# Patient Record
Sex: MALE | Race: WHITE | NOT HISPANIC OR LATINO | Employment: OTHER | URBAN - METROPOLITAN AREA
[De-identification: names, ages, dates, MRNs, and addresses within clinical notes are randomized per-mention and may not be internally consistent; named-entity substitution may affect disease eponyms.]

---

## 2022-07-24 ENCOUNTER — HOSPITAL ENCOUNTER (INPATIENT)
Facility: CLINIC | Age: 82
LOS: 3 days | Discharge: HOSPICE | DRG: 435 | End: 2022-07-27
Attending: EMERGENCY MEDICINE | Admitting: HOSPITALIST
Payer: MEDICARE

## 2022-07-24 ENCOUNTER — APPOINTMENT (OUTPATIENT)
Dept: CT IMAGING | Facility: CLINIC | Age: 82
DRG: 435 | End: 2022-07-24
Attending: EMERGENCY MEDICINE
Payer: MEDICARE

## 2022-07-24 DIAGNOSIS — R62.7 FAILURE TO THRIVE IN ADULT: ICD-10-CM

## 2022-07-24 DIAGNOSIS — C79.9 METASTATIC MALIGNANT NEOPLASM, UNSPECIFIED SITE (H): ICD-10-CM

## 2022-07-24 PROBLEM — I81 PORTAL VEIN THROMBOSIS: Status: ACTIVE | Noted: 2022-07-24

## 2022-07-24 LAB
ALBUMIN SERPL-MCNC: 1.9 G/DL (ref 3.4–5)
ALP SERPL-CCNC: 759 U/L (ref 40–150)
ALT SERPL W P-5'-P-CCNC: 33 U/L (ref 0–70)
ANION GAP SERPL CALCULATED.3IONS-SCNC: 8 MMOL/L (ref 3–14)
AST SERPL W P-5'-P-CCNC: 231 U/L (ref 0–45)
BASOPHILS # BLD AUTO: 0.1 10E3/UL (ref 0–0.2)
BASOPHILS NFR BLD AUTO: 1 %
BILIRUB SERPL-MCNC: 2.2 MG/DL (ref 0.2–1.3)
BUN SERPL-MCNC: 25 MG/DL (ref 7–30)
CALCIUM SERPL-MCNC: 9.8 MG/DL (ref 8.5–10.1)
CHLORIDE BLD-SCNC: 105 MMOL/L (ref 94–109)
CO2 SERPL-SCNC: 23 MMOL/L (ref 20–32)
CREAT SERPL-MCNC: 0.58 MG/DL (ref 0.66–1.25)
D DIMER PPP FEU-MCNC: 3.48 UG/ML FEU (ref 0–0.5)
EOSINOPHIL # BLD AUTO: 0.1 10E3/UL (ref 0–0.7)
EOSINOPHIL NFR BLD AUTO: 1 %
ERYTHROCYTE [DISTWIDTH] IN BLOOD BY AUTOMATED COUNT: 17.2 % (ref 10–15)
GFR SERPL CREATININE-BSD FRML MDRD: >90 ML/MIN/1.73M2
GLUCOSE BLD-MCNC: 180 MG/DL (ref 70–99)
HCT VFR BLD AUTO: 45.7 % (ref 40–53)
HGB BLD-MCNC: 14.7 G/DL (ref 13.3–17.7)
HOLD SPECIMEN: NORMAL
IMM GRANULOCYTES # BLD: 0.1 10E3/UL
IMM GRANULOCYTES NFR BLD: 1 %
LIPASE SERPL-CCNC: 112 U/L (ref 73–393)
LYMPHOCYTES # BLD AUTO: 2.1 10E3/UL (ref 0.8–5.3)
LYMPHOCYTES NFR BLD AUTO: 19 %
MCH RBC QN AUTO: 33.3 PG (ref 26.5–33)
MCHC RBC AUTO-ENTMCNC: 32.2 G/DL (ref 31.5–36.5)
MCV RBC AUTO: 103 FL (ref 78–100)
MONOCYTES # BLD AUTO: 0.7 10E3/UL (ref 0–1.3)
MONOCYTES NFR BLD AUTO: 6 %
NEUTROPHILS # BLD AUTO: 8.1 10E3/UL (ref 1.6–8.3)
NEUTROPHILS NFR BLD AUTO: 72 %
NRBC # BLD AUTO: 0 10E3/UL
NRBC BLD AUTO-RTO: 0 /100
PLATELET # BLD AUTO: 279 10E3/UL (ref 150–450)
POTASSIUM BLD-SCNC: 4 MMOL/L (ref 3.4–5.3)
PROT SERPL-MCNC: 7 G/DL (ref 6.8–8.8)
RADIOLOGIST FLAGS: ABNORMAL
RBC # BLD AUTO: 4.42 10E6/UL (ref 4.4–5.9)
SARS-COV-2 RNA RESP QL NAA+PROBE: NEGATIVE
SODIUM SERPL-SCNC: 136 MMOL/L (ref 133–144)
TROPONIN I SERPL HS-MCNC: 5 NG/L
WBC # BLD AUTO: 11.2 10E3/UL (ref 4–11)

## 2022-07-24 PROCEDURE — 84484 ASSAY OF TROPONIN QUANT: CPT | Performed by: EMERGENCY MEDICINE

## 2022-07-24 PROCEDURE — G1010 CDSM STANSON: HCPCS

## 2022-07-24 PROCEDURE — 36415 COLL VENOUS BLD VENIPUNCTURE: CPT | Performed by: EMERGENCY MEDICINE

## 2022-07-24 PROCEDURE — 250N000009 HC RX 250: Performed by: EMERGENCY MEDICINE

## 2022-07-24 PROCEDURE — 85379 FIBRIN DEGRADATION QUANT: CPT | Performed by: EMERGENCY MEDICINE

## 2022-07-24 PROCEDURE — G0378 HOSPITAL OBSERVATION PER HR: HCPCS

## 2022-07-24 PROCEDURE — 250N000011 HC RX IP 250 OP 636: Performed by: EMERGENCY MEDICINE

## 2022-07-24 PROCEDURE — U0005 INFEC AGEN DETEC AMPLI PROBE: HCPCS | Performed by: EMERGENCY MEDICINE

## 2022-07-24 PROCEDURE — 99285 EMERGENCY DEPT VISIT HI MDM: CPT | Mod: CS,25

## 2022-07-24 PROCEDURE — 120N000001 HC R&B MED SURG/OB

## 2022-07-24 PROCEDURE — 83690 ASSAY OF LIPASE: CPT | Performed by: EMERGENCY MEDICINE

## 2022-07-24 PROCEDURE — 80053 COMPREHEN METABOLIC PANEL: CPT | Performed by: EMERGENCY MEDICINE

## 2022-07-24 PROCEDURE — 93005 ELECTROCARDIOGRAM TRACING: CPT

## 2022-07-24 PROCEDURE — 99223 1ST HOSP IP/OBS HIGH 75: CPT | Mod: AI | Performed by: HOSPITALIST

## 2022-07-24 PROCEDURE — C9803 HOPD COVID-19 SPEC COLLECT: HCPCS

## 2022-07-24 PROCEDURE — 85025 COMPLETE CBC W/AUTO DIFF WBC: CPT | Performed by: EMERGENCY MEDICINE

## 2022-07-24 RX ORDER — TIMOLOL 5 MG/ML
1 SOLUTION/ DROPS OPHTHALMIC DAILY
Status: ON HOLD | COMMUNITY
End: 2022-07-25

## 2022-07-24 RX ORDER — HEPARIN SODIUM 10000 [USP'U]/100ML
0-5000 INJECTION, SOLUTION INTRAVENOUS CONTINUOUS
Status: DISCONTINUED | OUTPATIENT
Start: 2022-07-24 | End: 2022-07-24

## 2022-07-24 RX ORDER — ONDANSETRON 4 MG/1
4 TABLET, ORALLY DISINTEGRATING ORAL EVERY 6 HOURS PRN
Status: DISCONTINUED | OUTPATIENT
Start: 2022-07-24 | End: 2022-07-26

## 2022-07-24 RX ORDER — CEFUROXIME AXETIL 500 MG/1
500 TABLET ORAL 2 TIMES DAILY
Status: ON HOLD | COMMUNITY
End: 2022-07-27

## 2022-07-24 RX ORDER — LATANOPROST 50 UG/ML
1 SOLUTION/ DROPS OPHTHALMIC DAILY
COMMUNITY

## 2022-07-24 RX ORDER — ONDANSETRON 2 MG/ML
4 INJECTION INTRAMUSCULAR; INTRAVENOUS EVERY 6 HOURS PRN
Status: DISCONTINUED | OUTPATIENT
Start: 2022-07-24 | End: 2022-07-26

## 2022-07-24 RX ORDER — ACETAMINOPHEN 500 MG
TABLET ORAL DAILY PRN
COMMUNITY

## 2022-07-24 RX ORDER — ASPIRIN 81 MG/1
81 TABLET ORAL DAILY
Status: ON HOLD | COMMUNITY
End: 2022-07-27

## 2022-07-24 RX ORDER — LIDOCAINE 40 MG/G
CREAM TOPICAL
Status: DISCONTINUED | OUTPATIENT
Start: 2022-07-24 | End: 2022-07-27 | Stop reason: HOSPADM

## 2022-07-24 RX ORDER — METRONIDAZOLE 500 MG/1
500 TABLET ORAL 2 TIMES DAILY
Status: ON HOLD | COMMUNITY
End: 2022-07-27

## 2022-07-24 RX ORDER — IOPAMIDOL 755 MG/ML
106 INJECTION, SOLUTION INTRAVASCULAR ONCE
Status: COMPLETED | OUTPATIENT
Start: 2022-07-24 | End: 2022-07-24

## 2022-07-24 RX ADMIN — IOPAMIDOL 106 ML: 755 INJECTION, SOLUTION INTRAVENOUS at 17:01

## 2022-07-24 RX ADMIN — SODIUM CHLORIDE 70 ML: 900 INJECTION INTRAVENOUS at 17:01

## 2022-07-24 ASSESSMENT — ENCOUNTER SYMPTOMS
SHORTNESS OF BREATH: 1
CONSTIPATION: 0
APPETITE CHANGE: 1
COUGH: 1
ROS GI COMMENTS: INCONTINENCE +
DIARRHEA: 0
ABDOMINAL DISTENTION: 1
DIFFICULTY URINATING: 0
WEAKNESS: 1
ABDOMINAL PAIN: 0
FEVER: 0

## 2022-07-24 ASSESSMENT — ACTIVITIES OF DAILY LIVING (ADL)
WALKING_OR_CLIMBING_STAIRS_DIFFICULTY: YES
DRESSING/BATHING_DIFFICULTY: YES
CHANGE_IN_FUNCTIONAL_STATUS_SINCE_ONSET_OF_CURRENT_ILLNESS/INJURY: YES
DRESSING/BATHING: DRESSING DIFFICULTY, DEPENDENT
ADLS_ACUITY_SCORE: 32
TOILETING: 1-->ASSISTANCE (EQUIPMENT/PERSON) NEEDED (NOT DEVELOPMENTALLY APPROPRIATE)
DOING_ERRANDS_INDEPENDENTLY_DIFFICULTY: NO
ADLS_ACUITY_SCORE: 44
CONCENTRATING,_REMEMBERING_OR_MAKING_DECISIONS_DIFFICULTY: NO
DIFFICULTY_EATING/SWALLOWING: NO
TOILETING: 1-->ASSISTANCE (EQUIPMENT/PERSON) NEEDED
TOILETING_ISSUES: YES
DIFFICULTY_COMMUNICATING: NO
WALKING_OR_CLIMBING_STAIRS: AMBULATION DIFFICULTY, REQUIRES EQUIPMENT
NUMBER_OF_TIMES_PATIENT_HAS_FALLEN_WITHIN_LAST_SIX_MONTHS: 1
WEAR_GLASSES_OR_BLIND: YES
FALL_HISTORY_WITHIN_LAST_SIX_MONTHS: YES
TOILETING_ASSISTANCE: TOILETING DIFFICULTY, ASSISTANCE 1 PERSON
DRESS: 2-->COMPLETELY DEPENDENT
EQUIPMENT_CURRENTLY_USED_AT_HOME: WALKER, STANDARD
DRESS: 2-->COMPLETELY DEPENDENT (NOT DEVELOPMENTALLY APPROPRIATE)
BATHING: 2-->COMPLETELY DEPENDENT (NOT DEVELOPMENTALLY APPROPRIATE)

## 2022-07-24 NOTE — ED NOTES
[facility]  ED Nurse Handoff Report    ED Chief complaint: Shortness of Breath and Generalized Weakness      ED Diagnosis:   Final diagnoses:   Failure to thrive in adult   Metastatic malignant neoplasm, unspecified site (H)       Code Status:     Allergies: No Known Allergies    Patient Story: Pt present via EMS, per EMS pt from home getting more weak recently. Pt has stage 4 cancer recently discharge from hospital for sepsis. Pt also states he is sob and weak. At home on several different\ abx   Focused Assessment:  CT shows pts cancer has spread to other parts of his body.  CT shows rao for heparin but provider and pt family decided not to continue with heparin. Wife and family at bedside with pt.     Treatments and/or interventions provided: blood work imaging   Patient's response to treatments and/or interventions: therapeutic     To be done/followed up on inpatient unit:  possible hospice care     Does this patient have any cognitive concerns?: none    Activity level - Baseline/Home:  Stand with assist x2  Activity Level - Current:   Total Care    Patient's Preferred language: English   Needed?: No    Isolation: None  Infection: Not Applicable  Patient tested for COVID 19 prior to admission: YES  Bariatric?: No    Vital Signs:   Vitals:    07/24/22 1538 07/24/22 1749   BP: (!) 151/82    Pulse: 96    Resp: 16    Temp: 97.6  F (36.4  C)    TempSrc: Temporal    SpO2: 96% 95%   Weight: 77.1 kg (169 lb 15.6 oz)        Cardiac Rhythm:     Was the PSS-3 completed:   Yes  What interventions are required if any?               Family Comments: concern for spreading cancer   OBS brochure/video discussed/provided to patient/family: N/A              Name of person given brochure if not patient:               Relationship to patient:     For the majority of the shift this patient's behavior was Green.   Behavioral interventions performed were none .    ED NURSE PHONE NUMBER: RN 1

## 2022-07-24 NOTE — ED TRIAGE NOTES
Pt present via EMS, per EMS pt from home getting more weak recently. Pt has stage 4 cancer recently discharge from hospital for sepsis. Pt also states he is sob and weak. At home on several different\ abx

## 2022-07-24 NOTE — H&P
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  CT chest for PE negative for pulmonary embolism.  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  Patient is being mainly admitted for failure to thrive and progressive weakness.    #1 progressive fatigue with failure to thrive  -Likely from his underlying malignancy.  Unclear prognosis at this time.  -[place] [facility] consulted.  -Patient has previous known history of portal vein tumor thrombosis.  No need for anticoagulation at this time.  Has chronic cirrhosis.  -Nutrition services consulted.  Placed on regular diet at this time.  -Likely has underlying severe protein calorie malnutrition.  -No obvious source of infection.  Was recently treated for sepsis of unclear source.  Currently has 3 more days left of his Flagyl and cefuroxime.  We will continue this once his med rec is complete.  -Has loose stools but only once a day.  Unlikely for this to be C. Difficile.  -We will hold off on any IV fluids for now since he sounds like he is got some fluid in his lungs and has ascites which could worsen.  His blood pressure stable for now.  Monitor closely.  We will hold his antihypertensives for now.    2.  Metastatic hepatocellular cancer-currently on immunotherapy.  Plan for possible radiation treatment of the local mass.  No notes available for me to review.  [place] [facility] consulted.  They will evaluate the patient tomorrow.      DVT Prophylaxis: On heparin drip  Code Status: DNR / DNI    Disposition: Expected discharge when medically stable    Isabela Masterson  MD, MD  [phone] (p)  [phone] (c)    Primary Care Physician   *Physician No Ref-Primary    Chief Complaint   Progressively worsening weakness and shortness of breath    History is obtained from the patient and review of medical records.    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  Was found to have portal vein thrombosis, possibly tumor thrombus.  Being admitted for further management of this.    Patient's history is significant for monoclonal B-cell lymphocytosis diagnosed in 2016 managed with observation.  In 2019 lymphocyte count increased to a point consistent with early stage CLL.  Has not required treatment to date.  He had progressively worsening weight loss and decreased appetite and had a CT abdomen pelvis on March 29, 2022 that showed large heterogeneous right hepatic lobe mass with thrombus within the right distal portal vein and posterior segment portal vein concerning for tumor thrombus with nodular liver contour suggestive of cirrhosis.  On April 5, 2022 he underwent ultrasound-guided biopsy with pathology confirming poorly differentiated hepatocellular carcinoma.    Patient was diagnosed with biopsy-proven poorly differentiated hepatocellular carcinoma with large right hepatic lobe mass with portal vein invasion and an indeterminate 1.8 cm mediastinal lymph node.  Stage IIIb he then underwent multiphasic CT liver and was referred to radiation oncology for liver dedicated treatment.  Patient at that time was not considered a surgical candidate due to portal vein involvement.  Recommended transarterial liver directed therapy with radioembolization.  Patient is currently on atezolizumab/bevacizumab treatment.  Follows with [facility]  Dr. Doan.  Was admitted at [facility] on 2022 for sepsis of unknown origin and was treated with cefuroxime and Flagyl and improved.  Was discharged on 2022.  No obvious source of infection found at that time.  Source was presumed to be biliary stasis/cholangitis from tumor burden in the liver.    Since his discharge he has been feeling more weak with reduced appetite and was too weak to even get out of the bed.  He has had cough that is been improving but he has been having increasing shortness of breath.  Feels like his abdomen is more distended.  No fever, abdominal pain, chest pain or leg swelling.  CT chest for PE with CT abdomen pelvis were done in the ER here that showed extensive tumor involvement of the right hepatic lobe with involvement of portal veins with tumor all involving all of the right portal veins and extending centrally into the main portal vein and proximal left portal vein .  Patient was initially started on heparin drip and this was discontinued after discussion with oncology.  [place] that the tumor thrombus is chronic.  . He does have small volume ascites with some splenomegaly and cirrhosis related changes.  No PE.  Has elevated LFTs including alk phos which are chronic from his underlying malignancy.  Patient is being admitted for mainly failure to thrive and progressive fatigue.    Past Medical History    I have reviewed this patient's medical history and updated it with pertinent information if needed.   No past medical history on file.  Past Surgical History   I have reviewed this patient's surgical history and updated it with pertinent information if needed.  No past surgical history on file.  Prior to Admission Medications   Prior to Admission Medications   Prescriptions Last Dose Informant Patient Reported? Taking?   ALLEGRA 180 MG OR TABS   Yes No   Si TABLET DAILY   ASPIRIN 81 MG OR CPEP   Yes No   Si CAPSULE DAILY   BL FLAX SEED OIL OR   Yes No   Sig: None Entered    FISH OIL   Yes No   Sig: None Entered   LISINOPRIL OR   Yes No   Sig: None Entered   VITAMIN E   Yes No   Sig: None Entered      Facility-Administered Medications: None     Allergies   No Known Allergies  Social History   I have reviewed this patient's social history and updated it with pertinent information if needed.   Tani  reports that he has never smoked. He does not have any smokeless tobacco history on file. He     Family History   I have reviewed this patient's family history and updated it with pertinent information if needed.     Review of Systems   The 10 point Review of Systems is negative other than noted in the HPI or here.     Physical Exam   Temp: 97.6  F (36.4  C) Temp src: Temporal BP: (!) 151/82 Pulse: 96   Resp: 16 SpO2: 95 %      Vital Signs with Ranges  Temp:  [97.6  F (36.4  C)] 97.6  F (36.4  C)  Pulse:  [96] 96  Resp:  [16] 16  BP: (151)/(82) 151/82  SpO2:  [95 %-96 %] 95 %  169 lbs 15.59 oz    Physical Exam  Constitutional:       Appearance: He is ill-appearing.      Comments: Appears thin and weak and cachectic   HENT:      Head: Normocephalic.   Eyes:      Pupils: Pupils are equal, round, and reactive to light.   Cardiovascular:      Rate and Rhythm: Regular rhythm. Tachycardia present.      Pulses: Normal pulses.      Heart sounds: Normal heart sounds.   Pulmonary:      Effort: Pulmonary effort is normal. No respiratory distress.      Breath sounds: Normal breath sounds.   Abdominal:      General: There is distension.      Tenderness: There is no abdominal tenderness. There is no guarding.      Comments: Distended abdomen.  No focal tenderness.   Musculoskeletal:         General: Normal range of motion.      Cervical back: Normal range of motion.      Right lower leg: Edema present.      Left lower leg: Edema present.   Skin:     General: Skin is warm and dry.      Coloration: Skin is pale.   Neurological:      General: No focal deficit present.   Psychiatric:         Mood and  Affect: Mood normal.         Data   Data reviewed today:  I personally reviewed the chest CT image(s) showing No PE and the abdominal CT image(s) showing Hepatocellular cancer with portal vein tumor thrombus.  Recent Labs   Lab 07/24/22  1551   WBC 11.2*   HGB 14.7   *         POTASSIUM 4.0   CHLORIDE 105   CO2 23   BUN 25   CR 0.58*   ANIONGAP 8   JENNYFER 9.8   *   ALBUMIN 1.9*   PROTTOTAL 7.0   BILITOTAL 2.2*   ALKPHOS 759*   ALT 33   *   LIPASE 112       Recent Results (from the past 24 hour(s))   CT Chest (PE) Abdomen Pelvis w Contrast   Result Value    Radiologist flags Metastatic disease, tumor thrombus (Urgent)    Narrative    EXAM: CT CHEST PE ABDOMEN PELVIS W CONTRAST  LOCATION: [facility]  DATE/TIME: 7/24/2022 4:53 PM    INDICATION: Increased shortness of breath, elevated dimer, rule out PE, increased abdominal distention, history of liver cancer  COMPARISON: None.  TECHNIQUE: CT chest pulmonary angiogram and routine CT abdomen pelvis with IV contrast. Arterial phase through the chest and venous phase through the abdomen and pelvis. Multiplanar reformats and MIP reconstructions were performed. Dose reduction   techniques were used.   CONTRAST: 106mL Isovue 370        FINDINGS:  ANGIOGRAM CHEST: Pulmonary arteries are normal caliber and negative for pulmonary emboli. Thoracic aorta is negative for dissection. No CT evidence of right heart strain.     LUNGS AND PLEURA: Numerous (at least 20) bilateral pulmonary nodules. For example:  -Anterior right upper lobe, 1.9 x 1.7 cm (11/101)  -Left upper lobe laterally, 1.1 x 1.1 cm (11/93).    Small bilateral pleural effusions, right greater than left.    MEDIASTINUM/AXILLAE: Enlarged prevascular lymph node measuring 1.8 x 1.3 cm (9/100) and right hilar lymph node measuring 3.3 x 3.0 cm (9/114).    CORONARY ARTERY CALCIFICATION: Previous intervention (stents or CABG).    HEPATOBILIARY: Cirrhotic morphology with  innumerable masses involving almost the entire right lobe. The largest mass in the inferior right lobe measures 13.5 x 11.3 cm (6/78). Expansile soft tissue involving the entire right portal vein, which extends   centrally into the main portal vein and proximal portion of the left portal vein (e.g. 6/54). Hepatic veins are not well visualized.    Small volume ascites.    PANCREAS: Calcifications in the head suggesting chronic pancreatitis.    SPLEEN: Enlarged, 16.9 cm craniocaudal.    ADRENAL GLANDS: 1.5 x 1.1 cm left adrenal nodule (6/78). Normal right adrenal.    KIDNEYS/BLADDER: Low-attenuation lesion in the lower pole right kidney measures just above fluid attenuation, probably a minimally complex cyst.    BOWEL: Normal.    LYMPH NODES: No lymphadenopathy.    VASCULATURE: No abdominal aortic aneurysm. Mild atherosclerotic calcification.    PELVIC ORGANS: Normal.    MUSCULOSKELETAL: Partially healed right eighth rib fracture posteriorly. No aggressive or destructive lesions.      Impression    IMPRESSION:  1.  No acute pulmonary embolism or secondary signs of right heart strain.    2.  Morphologic changes of cirrhosis with the sequelae of portal hypertension, including splenomegaly and small volume ascites.    3.  Extensive tumor involvement of the entire right hepatic lobe with reported history of hepatocellular carcinoma. There is extends tumor involvement of the portal veins with tumor in vein involving all of the right portal veins and extending centrally   into the main portal vein and proximal left portal vein.    4.  Widespread metastatic disease involving the lungs, thoracic lymph nodes, and left adrenal gland.    5.  Small bilateral pleural effusions.    [Urgent Result: Metastatic disease, tumor thrombus]    Finding was identified on 7/24/2022 5:18 PM.     Dr. Tone Rogers was contacted by me on 7/24/2022 5:39 PM and verbalized understanding of the critical result.

## 2022-07-24 NOTE — ED NOTES
Bed: ED02  Expected date: 7/24/22  Expected time: 3:22 PM  Means of arrival: Ambulance  Comments:   513 82m weak, sob, liver CA. ETA 1535

## 2022-07-24 NOTE — ED PROVIDER NOTES
History     Chief Complaint:  Shortness of Breath and Generalized Weakness     HPI:  The history is provided by the patient and the spouse.      Tani Tejeda is a 82 year old male on 81 mg aspirin with a history of hepatic carcinoma, type II diabetes mellitus who presents with generalized weakness, shortness of breath, and lack of appetite which have been ongoing for some time but have recently worsened. Per chart review, he does have a history of hepatocellular carcinoma and has undergone 3 chemotherapy treatments, most recently on 7/1. He was recently admitted to [facility] on 7/12 with sepsis of unknown source and treated with broad spectrum antibiotics. He was discharged on 7/18 with courses of cefuroxime and metronidazole which he has been taking as prescribed. Since his discharge, he states that he has been feeling more generally weak. His wife reports that he has had a decreased appetite and was too weak to get out of bed today. He has had a cough which has been improving; however, he feels increasingly short of breath and his wife reports that he has been rather tachypneic. He notes that his mouth feels dry and he also feels like his abdomen is distended. He has been urinating and having bowel movements but he notes that he is incontinent of stool and urine. Heladio denies fever, abdominal pain, chest pain, leg swelling.    Review of Systems   Constitutional: Positive for appetite change. Negative for fever.   Respiratory: Positive for cough (improving) and shortness of breath.    Cardiovascular: Negative for chest pain and leg swelling.   Gastrointestinal: Positive for abdominal distention. Negative for abdominal pain, constipation and diarrhea.        Incontinence +   Genitourinary: Negative for decreased urine volume and difficulty urinating.        Incontinence +   Neurological: Positive for weakness.   All other systems reviewed and are negative.    Allergies:   Iodinated contrast  media    Medications:  Allegra  Aspirin 81 mg  Lisinopril  Atezolizumab     Past Medical History:     POAG, right eye  Hepatic carcinoma  Pseudophakia, both eyes   Type II diabetes mellitus   Retinoschisis, left eye  Posterior vitreous detachment, bilateral  Vitreous floaters  Ocular hypertension, left eye  Anterior basement membrane dystrophy  Hypertension   Sepsis  Hyponatremia  Osteoarthritis  CLL  Cataracts, bilateral   Dysmetabolic syndrome X  SCC  Degeneration of lumbar or lumbosacral intervertebral disc  Impaired fasting glucose  Recurrent sinus infections    Lymphocytosis   Osteoarthrosis  Shingles     Past Surgical History:    Carpal tunnel release, left  Rotator cuff repair, right  Colonoscopy  Cataract surgery, bilateral  Liver biopsy     Family History:    Father: cataracts, diabetes  Mother: cataracts, hypertension  Sister: heart disease     Social History:  The patient presents to the ED with his wife.  The patient presents to the ED via EMS.     Physical Exam     Patient Vitals for the past 24 hrs:   BP Temp Temp src Pulse Resp SpO2 Weight   07/24/22 2052 133/84 97.9  F (36.6  C) Axillary 105 16 95 % --   07/24/22 2031 (!) 145/83 -- -- -- -- 96 % --   07/24/22 2030 -- -- -- -- -- 95 % --   07/24/22 2022 -- -- -- -- -- -- 77.1 kg (169 lb 15.6 oz)   07/24/22 2000 -- -- -- -- -- 94 % --   07/24/22 1930 -- -- -- -- -- 93 % --   07/24/22 1900 -- -- -- -- -- 95 % --   07/24/22 1830 -- -- -- -- -- 95 % --   07/24/22 1749 -- -- -- -- -- 95 % --   07/24/22 1538 (!) 151/82 97.6  F (36.4  C) Temporal 96 16 96 % 77.1 kg (169 lb 15.6 oz)     Physical Exam  General: Alert and cooperative with exam. Patient in mild distress. Normal mentation.  Chronic ill appearance.  Frail and cachectic  Head:  Scalp is NC/AT  Eyes:  No scleral icterus, PERRL  ENT:  The external nose and ears are normal. The oropharynx is normal and without erythema; mucus membranes are dry. Uvula midline, no evidence of deep space  infection.  Neck:  Normal range of motion without rigidity.  CV:  Regular rate and rhythm    No pathologic murmur   Resp:  Breath sounds are clear bilaterally    Mild tachypnea  GI:  Abdomen is soft, mild distension, no tenderness. No peritoneal signs  MS:  No lower extremity edema   Skin:  Warm and dry, No rash or lesions noted.  Neuro: Oriented x 3. No gross motor deficits.    Emergency Department Course     ECG  ECG taken at 1843, ECG read at 1849  Sinus rhythm with occasional premature ventricular complexes  Low voltage QRS  Nonspecific T wave abnormality  Abnormal ECG  Rate 93 bpm. LA interval 172 ms. QRS duration 74 ms. QT/QTc 346/430 ms. P-R-T axes 45 11 50.     Imaging:  CT Chest (PE) Abdomen Pelvis w Contrast   Final Result   Abnormal   IMPRESSION:   1.  No acute pulmonary embolism or secondary signs of right heart strain.      2.  Morphologic changes of cirrhosis with the sequelae of portal hypertension, including splenomegaly and small volume ascites.      3.  Extensive tumor involvement of the entire right hepatic lobe with reported history of hepatocellular carcinoma. There is extends tumor involvement of the portal veins with tumor in vein involving all of the right portal veins and extending centrally    into the main portal vein and proximal left portal vein.      4.  Widespread metastatic disease involving the lungs, thoracic lymph nodes, and left adrenal gland.      5.  Small bilateral pleural effusions.      [Urgent Result: Metastatic disease, tumor thrombus]      Finding was identified on 7/24/2022 5:18 PM.       Dr. Tone Rogers was contacted by me on 7/24/2022 5:39 PM and verbalized understanding of the critical result.         Report per radiology    Laboratory:  Labs Ordered and Resulted from Time of ED Arrival to Time of ED Departure   COMPREHENSIVE METABOLIC PANEL - Abnormal       Result Value    Sodium 136      Potassium 4.0      Chloride 105      Carbon Dioxide (CO2) 23      Anion  Gap 8      Urea Nitrogen 25      Creatinine 0.58 (*)     Calcium 9.8      Glucose 180 (*)     Alkaline Phosphatase 759 (*)      (*)     ALT 33      Protein Total 7.0      Albumin 1.9 (*)     Bilirubin Total 2.2 (*)     GFR Estimate >90     D DIMER QUANTITATIVE - Abnormal    D-Dimer Quantitative 3.48 (*)    CBC WITH PLATELETS AND DIFFERENTIAL - Abnormal    WBC Count 11.2 (*)     RBC Count 4.42      Hemoglobin 14.7      Hematocrit 45.7       (*)     MCH 33.3 (*)     MCHC 32.2      RDW 17.2 (*)     Platelet Count 279      % Neutrophils 72      % Lymphocytes 19      % Monocytes 6      % Eosinophils 1      % Basophils 1      % Immature Granulocytes 1      NRBCs per 100 WBC 0      Absolute Neutrophils 8.1      Absolute Lymphocytes 2.1      Absolute Monocytes 0.7      Absolute Eosinophils 0.1      Absolute Basophils 0.1      Absolute Immature Granulocytes 0.1      Absolute NRBCs 0.0     TROPONIN I - Normal    Troponin I High Sensitivity 5     LIPASE - Normal    Lipase 112     COVID-19 VIRUS (CORONAVIRUS) BY PCR - Normal    SARS CoV2 PCR Negative     ROUTINE UA WITH MICROSCOPIC      Emergency Department Course:       Reviewed:  I reviewed nursing notes, vitals, past medical history and Care Everywhere    Assessments:  1610 I obtained history and examined the patient as noted above.   1808 I rechecked the patient and explained findings.     Consults:  1737 I spoke with Dr. Ryan from radiology regarding the patient's imaging findings.   1758 I spoke with Dr. Ramiro Ibrahim from [facility] regarding the patient's presentation and plan of care.  1804 I spoke with Dr. Masterson from the hospitalist service regarding the patient's presentation, findings here in the ED, and plan of care.   1806 I spoke with Dr. Ramiro Ibrahim from [facility] regarding the patient's plan of care.    Disposition:  The patient was admitted to the hospital under the care of Dr. Masterson.     Impression & Plan     Medical Decision  Making:  Tani Tejeda is a 82 year old male who presents to the emergency department for evaluation of increased shortness of breath, generalized weakness, increased abdominal distention; history of metastatic liver cancer and recent hospitalization for potential cholangitis and is currently on antibiotic treatment.  Patient's medical history and records reviewed.  On evaluation patient is frail in appearance and demonstrates only mild tachypnea with normal oxygen saturations.  Labs and imaging were obtained.  Labs notable for elevated LFTs as noted above and elevated D-dimer.  EKG demonstrates sinus rhythm with occasional PVCs and no significant evidence of acute ischemia, infarction, or other significant arrhythmia; troponin negative patient no chest pain; ACS unlikely.  Patient without significant abdominal tenderness on evaluation.  Given elevated dimer and increased abdominal distention, CTA of chest and CT abdomen pelvis were obtained.  No evidence of PE though patient does demonstrate significant metastatic disease with thrombosis of portal vein as noted above.  These findings were discussed with Dr. Ramiro Ibrahim of [place] [facility] who reviewed outside hospital images; thrombosis is chronic.  No indication for heparin therapy at this time.  Presentation is currently consistent with failure to thrive in setting of worsening metastatic disease.  Patient admitted to observation with the hospitalist service for further evaluation and care.    Diagnosis:    ICD-10-CM    1. Failure to thrive in adult  R62.7    2. Metastatic malignant neoplasm, unspecified site (H)  C79.9      Scribe Disclosure:  Nighat MOURA, am serving as a scribe at 4:06 PM on 7/24/2022 to document services personally performed by Tone Rogers DO based on my observations and the provider's statements to me.      Tone Rogers DO  07/25/22 0019

## 2022-07-25 ENCOUNTER — APPOINTMENT (OUTPATIENT)
Dept: OCCUPATIONAL THERAPY | Facility: CLINIC | Age: 82
DRG: 435 | End: 2022-07-25
Attending: HOSPITALIST
Payer: MEDICARE

## 2022-07-25 ENCOUNTER — APPOINTMENT (OUTPATIENT)
Dept: PHYSICAL THERAPY | Facility: CLINIC | Age: 82
DRG: 435 | End: 2022-07-25
Attending: HOSPITALIST
Payer: MEDICARE

## 2022-07-25 LAB
AFP SERPL-MCNC: 389 NG/ML
ALBUMIN SERPL-MCNC: 1.8 G/DL (ref 3.4–5)
ALP SERPL-CCNC: 636 U/L (ref 40–150)
ALT SERPL W P-5'-P-CCNC: 28 U/L (ref 0–70)
ANION GAP SERPL CALCULATED.3IONS-SCNC: 10 MMOL/L (ref 3–14)
AST SERPL W P-5'-P-CCNC: 211 U/L (ref 0–45)
ATRIAL RATE - MUSE: 93 BPM
BILIRUB SERPL-MCNC: 2.9 MG/DL (ref 0.2–1.3)
BUN SERPL-MCNC: 29 MG/DL (ref 7–30)
CALCIUM SERPL-MCNC: 9.6 MG/DL (ref 8.5–10.1)
CHLORIDE BLD-SCNC: 107 MMOL/L (ref 94–109)
CO2 SERPL-SCNC: 22 MMOL/L (ref 20–32)
CREAT SERPL-MCNC: 0.62 MG/DL (ref 0.66–1.25)
DIASTOLIC BLOOD PRESSURE - MUSE: NORMAL MMHG
ERYTHROCYTE [DISTWIDTH] IN BLOOD BY AUTOMATED COUNT: 17.3 % (ref 10–15)
GFR SERPL CREATININE-BSD FRML MDRD: >90 ML/MIN/1.73M2
GLUCOSE BLD-MCNC: 139 MG/DL (ref 70–99)
HCT VFR BLD AUTO: 41.5 % (ref 40–53)
HGB BLD-MCNC: 13.6 G/DL (ref 13.3–17.7)
INTERPRETATION ECG - MUSE: NORMAL
MCH RBC QN AUTO: 32.8 PG (ref 26.5–33)
MCHC RBC AUTO-ENTMCNC: 32.8 G/DL (ref 31.5–36.5)
MCV RBC AUTO: 100 FL (ref 78–100)
P AXIS - MUSE: 45 DEGREES
PLATELET # BLD AUTO: 280 10E3/UL (ref 150–450)
POTASSIUM BLD-SCNC: 4.1 MMOL/L (ref 3.4–5.3)
PR INTERVAL - MUSE: 172 MS
PROT SERPL-MCNC: 6.3 G/DL (ref 6.8–8.8)
QRS DURATION - MUSE: 74 MS
QT - MUSE: 346 MS
QTC - MUSE: 430 MS
R AXIS - MUSE: 11 DEGREES
RBC # BLD AUTO: 4.15 10E6/UL (ref 4.4–5.9)
SODIUM SERPL-SCNC: 139 MMOL/L (ref 133–144)
SYSTOLIC BLOOD PRESSURE - MUSE: NORMAL MMHG
T AXIS - MUSE: 50 DEGREES
VENTRICULAR RATE- MUSE: 93 BPM
WBC # BLD AUTO: 13.3 10E3/UL (ref 4–11)

## 2022-07-25 PROCEDURE — 258N000003 HC RX IP 258 OP 636: Performed by: HOSPITALIST

## 2022-07-25 PROCEDURE — 80053 COMPREHEN METABOLIC PANEL: CPT | Performed by: HOSPITALIST

## 2022-07-25 PROCEDURE — 250N000013 HC RX MED GY IP 250 OP 250 PS 637: Performed by: HOSPITALIST

## 2022-07-25 PROCEDURE — 36415 COLL VENOUS BLD VENIPUNCTURE: CPT | Performed by: HOSPITALIST

## 2022-07-25 PROCEDURE — 97161 PT EVAL LOW COMPLEX 20 MIN: CPT | Mod: GP

## 2022-07-25 PROCEDURE — 82105 ALPHA-FETOPROTEIN SERUM: CPT | Performed by: INTERNAL MEDICINE

## 2022-07-25 PROCEDURE — 99233 SBSQ HOSP IP/OBS HIGH 50: CPT | Performed by: HOSPITALIST

## 2022-07-25 PROCEDURE — G0378 HOSPITAL OBSERVATION PER HR: HCPCS

## 2022-07-25 PROCEDURE — 85014 HEMATOCRIT: CPT | Performed by: HOSPITALIST

## 2022-07-25 PROCEDURE — 120N000001 HC R&B MED SURG/OB

## 2022-07-25 PROCEDURE — 97166 OT EVAL MOD COMPLEX 45 MIN: CPT | Mod: GO | Performed by: OCCUPATIONAL THERAPIST

## 2022-07-25 PROCEDURE — 97530 THERAPEUTIC ACTIVITIES: CPT | Mod: GP

## 2022-07-25 RX ORDER — ACETAMINOPHEN 500 MG
500 TABLET ORAL DAILY PRN
Status: DISCONTINUED | OUTPATIENT
Start: 2022-07-25 | End: 2022-07-27 | Stop reason: HOSPADM

## 2022-07-25 RX ORDER — TIMOLOL MALEATE 5 MG/ML
1 SOLUTION/ DROPS OPHTHALMIC DAILY
COMMUNITY

## 2022-07-25 RX ORDER — SODIUM CHLORIDE 9 MG/ML
INJECTION, SOLUTION INTRAVENOUS CONTINUOUS
Status: DISCONTINUED | OUTPATIENT
Start: 2022-07-25 | End: 2022-07-27 | Stop reason: HOSPADM

## 2022-07-25 RX ORDER — METRONIDAZOLE 500 MG/1
500 TABLET ORAL 2 TIMES DAILY
Status: DISCONTINUED | OUTPATIENT
Start: 2022-07-25 | End: 2022-07-27 | Stop reason: HOSPADM

## 2022-07-25 RX ORDER — CEFUROXIME AXETIL 500 MG/1
500 TABLET ORAL 2 TIMES DAILY
Status: DISCONTINUED | OUTPATIENT
Start: 2022-07-25 | End: 2022-07-27 | Stop reason: HOSPADM

## 2022-07-25 RX ORDER — ASPIRIN 81 MG/1
81 TABLET ORAL DAILY
Status: DISCONTINUED | OUTPATIENT
Start: 2022-07-25 | End: 2022-07-27 | Stop reason: HOSPADM

## 2022-07-25 RX ORDER — TIMOLOL MALEATE 5 MG/ML
1 SOLUTION/ DROPS OPHTHALMIC DAILY
Status: DISCONTINUED | OUTPATIENT
Start: 2022-07-25 | End: 2022-07-27 | Stop reason: HOSPADM

## 2022-07-25 RX ADMIN — ACETAMINOPHEN 500 MG: 500 TABLET, FILM COATED ORAL at 18:51

## 2022-07-25 RX ADMIN — SODIUM CHLORIDE: 9 INJECTION, SOLUTION INTRAVENOUS at 09:54

## 2022-07-25 RX ADMIN — SODIUM CHLORIDE: 9 INJECTION, SOLUTION INTRAVENOUS at 22:58

## 2022-07-25 RX ADMIN — ASPIRIN 81 MG: 81 TABLET, COATED ORAL at 09:58

## 2022-07-25 RX ADMIN — CEFUROXIME AXETIL 500 MG: 500 TABLET ORAL at 09:58

## 2022-07-25 RX ADMIN — METRONIDAZOLE 500 MG: 500 TABLET, FILM COATED ORAL at 09:58

## 2022-07-25 RX ADMIN — CEFUROXIME AXETIL 500 MG: 500 TABLET ORAL at 20:34

## 2022-07-25 RX ADMIN — METRONIDAZOLE 500 MG: 500 TABLET, FILM COATED ORAL at 20:34

## 2022-07-25 ASSESSMENT — ACTIVITIES OF DAILY LIVING (ADL)
ADLS_ACUITY_SCORE: 56
ADLS_ACUITY_SCORE: 44
ADLS_ACUITY_SCORE: 60
ADLS_ACUITY_SCORE: 60
ADLS_ACUITY_SCORE: 55

## 2022-07-25 NOTE — PHARMACY-ADMISSION MEDICATION HISTORY
Pharmacy Medication History  Admission medication history interview status for the 7/24/2022  admission is complete. See EPIC admission navigator for prior to admission medications     Location of Interview: Patient room  Medication history sources: Patient, Patient's family/friend (Wife) and Care Everywhere    Significant changes made to the medication list:    Added:  Latanoprost   Timolol maleate eye drops   Vitamin D   Ceftin   Metronidazole  Tylenol     Changed:   Allergra-updated directions  Vitamin E-updated directions     Deleted:   Fish oil   Flax seed oil        Additional medication history information:   Care-everywhere was reviewed prior to interviewing the patient. The patient was with his wife at the time of te interview. She aided in the med history.     Please note, the patient was recently admitted from 7/12-7/18. At this time, he was given prescriptions for Ceftin 500 mg- 1 tablet PO BID and Metronidazole 500 mg- 1 tablet PO BID. Both medications are for a 9 day supply. The patient notes that he started these medications on 7/18. He notes that he took both doses this morning.     Medication reconciliation completed by provider prior to medication history? No    Time spent in this activity: 25 minutes     Prior to Admission medications    Medication Sig Last Dose Taking? Auth Provider Long Term End Date   acetaminophen (TYLENOL) 500 MG tablet Take by mouth daily as needed for mild pain Unknown at Unknown time Yes Unknown, Entered By History     ALLEGRA 180 MG OR TABS Take 180 mg by mouth daily as needed for allergies Unknown at Unknown time Yes Reported, Patient     aspirin 81 MG EC tablet Take 81 mg by mouth daily 7/24/2022 at AM Yes Unknown, Entered By History     cefuroxime (CEFTIN) 500 MG tablet Take 500 mg by mouth 2 times daily 7/24/2022 at Unknown time Yes Unknown, Entered By History     latanoprost (XALATAN) 0.005 % ophthalmic solution Place 1 drop into the right eye daily 7/23/2022 at  Unknown time Yes Unknown, Entered By History Yes    metroNIDAZOLE (FLAGYL) 500 MG tablet Take 500 mg by mouth 2 times daily 7/24/2022 at Unknown time Yes Unknown, Entered By History     timolol (PF) (TIMOPTIC OCUDOSE) 0.5 % ophthalmic solution Place 1 drop into the right eye daily 7/23/2022 at Unknown time Yes Unknown, Entered By History     VITAMIN E PO Take 1 capsule by mouth daily 7/24/2022 at AM Yes Unknown, Entered By History         The information provided in this note is only as accurate as the sources available at the time of update(s)

## 2022-07-25 NOTE — PROGRESS NOTES
RECEIVING UNIT ED HANDOFF REVIEW    ED Nurse Handoff Report was reviewed by: [place] RN on July 24, 2022 at 8:24 PM

## 2022-07-25 NOTE — PLAN OF CARE
[facility]      OUTPATIENT PHYSICAL THERAPY EVALUATION  PLAN OF TREATMENT FOR OUTPATIENT REHABILITATION  (COMPLETE FOR INITIAL CLAIMS ONLY)  Patient's Last Name, First Name, M.I.  YOB: 1940  Tani Tejeda                        Provider's Name  [facility] Medical Record No.  [id number]                               Onset Date:  07/24/22   Start of Care Date:  07/25/22      Type:     _X_PT   ___OT   ___SLP Medical Diagnosis:  Progressive weakness                        PT Diagnosis:  Decreased functional mobilit y   Visits from SOC:  1   _________________________________________________________________________________  Plan of Treatment/Functional Goals    Planned Interventions: balance training, bed mobility training, patient/family education, strengthening, transfer training, progressive activity/exercise, home program guidelines     Goals: See Physical Therapy Goals on Care Plan in [employer] electronic health record.    Therapy Frequency: 3x/week  Predicted Duration of Therapy Intervention: 08/01/22  _________________________________________________________________________________    I CERTIFY THE NEED FOR THESE SERVICES FURNISHED UNDER        THIS PLAN OF TREATMENT AND WHILE UNDER MY CARE     (Physician co-signature of this document indicates review and certification of the therapy plan).              Certification date from: 07/25/22, Certification date to: 08/01/22    Referring Physician: Isabela Masterson MD            Initial Assessment        See Physical Therapy evaluation dated 07/25/22 in Epic electronic health record.

## 2022-07-25 NOTE — PLAN OF CARE
Goal Outcome Evaluation:      [id number]  A/Ox4. VSS on RA. Denies pain. T/R q2h and prn. Calls appropriately. Urine very dark and lips/mouth so dry pt had difficulty talking; MD updated and NS @75/hr started. Very poor appetite, did not eat any of meals and states everything tastes funny. Family interested in hospice consult; MD waiting onc input.

## 2022-07-25 NOTE — PLAN OF CARE
Date/Time: July 25, 2022 [id number]   Reason for Admission: Failure to thrive    Cognitive/Mentation: AOX4, baseline speech is slow.  Neuros/CMS: Denies numbness and tingling.   VS: VSS, expect tachycardic. On RA. B/P: 125/74, T: 97.5, P: 100, R: 16     GI: Denies n/v.   : Incontinent. 1 loose small bm during this shift.   Pain: Pt denied pain.    Skin: Redness blanchable buttock/back. Repositioned q2h.  Activity: Bedfast  Diet: Regular, tolerating sips of water.       Discharge: Pending.     End of shift summary: No other events overnight.

## 2022-07-25 NOTE — CONSULTS
Consultation    Tani Tejeda MRN# [id number]   YOB: 1940 Age: 82 year old   Date of Admission: 7/24/2022  Requesting physician: Dr. Jaramillo  Reason for consult: HCC           Assessment and Plan:   Primary oncologist: Dr. Cortez     1. 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  - Was evaluated at multidisciplinary liver tumor board, plan was treat with Bevacizumab and Atezolizumab, restage and consider locoregional therapy   - He received 3 cycles Atezo and Marlen, completing 6/24/21  - A CT scan done 7/13/22 at [facility] revealed multiple new metastatic lesions scattered throughout the lungs and in the right hilum. The abdomen shows increase in size of dominant primarily hypodense mass filling the right lobe of the liver resulting in new mild hepatomegaly. Increased splenomegaly, now moderate. New mild ascites  - 7/25/22 calculated Child Schneider score for cirrhosis reveals class B using an old INR value (limits options for treatment to basically Sorafenib which is a pill)   - No biliary dilatation on scans, but slightly increasing bilirubin     2. Failure to thrive  - Albumin 1.8   - Likely due to cancer progression vs infection vs other, less likely treatment related   - Patient reports that since hospital discharge (7/18/22, detailed below), he has significantly declined, to the point where he was unable to get out of bed and presented to hospital  - He reports prior to 2 weeks ago, he was doing much better  - He follows with Dr. Cortez    PLAN  - Response rates in the second line for HCC after progression on Marlen/Atezo are not known, but expect them to be low, perhaps in the 10% range  - Will check new AFP, previous value in April 31,000   - Dr. Cortez will be by to see patient tomorrow, I talked to the patient about his wishes for comfort  "measures vs second line treatment, he would like to defer this conversation until tomorrow to include his family and treating oncologist   - We should ask radiology to compare new scan with prior to confirm no propagation of tumor thrombus     Patrick Dreyer, DO  [facility]  [phone] (office)             Chief Complaint:   Shortness of Breath and Generalized Weakness           History of Present Illness:   This patient is a 82 year old male with history of HCC with mets to lung who presented to the hospital on 22 with weakness. The patient was recently seen at OSH for an infection, when he was having temperatures to 104 degrees. Infectious workup was negative, and most likely source was felt to be hepatobiliary / cholangitis. ID was consulted and he was started on flagyl and cefuroxime. He reports that since hospital discharge he has declined to the point where he could not get out of bed on . He denies any fever recurrence. He has had persistent mild diarrhea. No new abdominal pain.     Heladio is an 82-year-old  man with history of hypertension diabetes and osteoarthritis.  He was found to have high lymphocyte count for couple of years and had peripheral blood smear with immunophenotyping in 2016 which confirmed monoclonal B cell lymphocytosis the absolute number was 2.9 and the cells were kappa restricted CD20 dim coexpression of CD43 CD5 and CD23.  Managed with observation. His lymphocyte count increased to over 5000 in 2019 consistent with early stage CLL         Physical Exam:   Vitals were reviewed  Blood pressure 129/77, pulse 102, temperature 97.6  F (36.4  C), temperature source Axillary, resp. rate 16, height 1.702 m (5' 7\"), weight 77.1 kg (169 lb 15.6 oz), SpO2 95 %.  Temperatures:  Current - Temp: 97.6  F (36.4  C); Max - Temp  Av.7  F (36.5  C)  Min: 97.5  F (36.4  C)  Max: 97.9  F (36.6  C)  Respiration range: Resp  Av  Min: 16  Max: 16  Pulse range: Pulse  Avg: " 100.8  Min: 96  Max: 105  Blood pressure range: Systolic (24hrs), Av , Min:125 , Max:151   ; Diastolic (24hrs), Av, Min:74, Max:84    Pulse oximetry range: SpO2  Av.8 %  Min: 93 %  Max: 96 %    Intake/Output Summary (Last 24 hours) at 2022 1410  Last data filed at 2022 1009  Gross per 24 hour   Intake 75 ml   Output --   Net 75 ml       GENERAL: No acute distress.Ill appearing, temporal wasting   SKIN: No rashes or jaundice.  HEENT: Normocephalic, atraumatic. Eyes anicteric. Oropharynx is clear.  LYMPH: No palpable lymphadenopathy in the cervical or supraclavicular regions  HEART: Regular rate and rhythm with no murmurs.  LUNGS: Clear bilaterally.  ABDOMEN: Soft, nontender, nondistended with hepatomegaly   EXTREMITIES: No clubbing, cyanosis, or edema.  MENTAL: Alert and oriented to person, place, and time.  NEURO: Cranial nerves II through XII grossly intact with no focal motor or sensory deficits.              Past Medical History:   I have reviewed this patient's past medical history  No past medical history on file.          Past Surgical History:   I have reviewed this patient's past surgical history  No past surgical history on file.            Social History:   I have reviewed this patient's social history  Social History     Tobacco Use     Smoking status: Never Smoker     Smokeless tobacco: Not on file   Substance Use Topics     Alcohol use: Not on file             Family History:   I have reviewed this patient's family history  No family history on file.          Allergies:   No Known Allergies          Medications:   I have reviewed this patient's current medications  Medications Prior to Admission   Medication Sig Dispense Refill Last Dose     acetaminophen (TYLENOL) 500 MG tablet Take by mouth daily as needed for mild pain   Unknown at Unknown time     ALLEGRA 180 MG OR TABS Take 180 mg by mouth daily as needed for allergies   Unknown at Unknown time     aspirin 81 MG EC tablet  Take 81 mg by mouth daily   7/24/2022 at AM     cefuroxime (CEFTIN) 500 MG tablet Take 500 mg by mouth 2 times daily   7/24/2022 at Unknown time     latanoprost (XALATAN) 0.005 % ophthalmic solution Place 1 drop into the right eye daily   7/23/2022 at Unknown time     metroNIDAZOLE (FLAGYL) 500 MG tablet Take 500 mg by mouth 2 times daily   7/24/2022 at Unknown time     timolol maleate (TIMOPTIC) 0.5 % ophthalmic solution Place 1 drop into the right eye daily   7/23/2022     VITAMIN E PO Take 1 capsule by mouth daily   7/24/2022 at AM     Current Facility-Administered Medications Ordered in Epic   Medication Dose Route Frequency Last Rate Last Admin     acetaminophen (TYLENOL) tablet 500 mg  500 mg Oral Daily PRN         aspirin EC tablet 81 mg  81 mg Oral Daily   81 mg at 07/25/22 0958     cefuroxime (CEFTIN) tablet 500 mg  500 mg Oral BID   500 mg at 07/25/22 0958     lidocaine (LMX4) cream   Topical Q1H PRN         lidocaine 1 % 0.1-1 mL  0.1-1 mL Other Q1H PRN         melatonin tablet 1 mg  1 mg Oral At Bedtime PRN         metroNIDAZOLE (FLAGYL) tablet 500 mg  500 mg Oral BID   500 mg at 07/25/22 0958     ondansetron (ZOFRAN ODT) ODT tab 4 mg  4 mg Oral Q6H PRN        Or     ondansetron (ZOFRAN) injection 4 mg  4 mg Intravenous Q6H PRN         sodium chloride (PF) 0.9% PF flush 3 mL  3 mL Intracatheter Q8H   3 mL at 07/25/22 0414     sodium chloride (PF) 0.9% PF flush 3 mL  3 mL Intracatheter q1 min prn         sodium chloride 0.9% infusion   Intravenous Continuous 75 mL/hr at 07/25/22 0954 New Bag at 07/25/22 0954     timolol maleate (TIMOPTIC) 0.5 % ophthalmic solution 1 drop  1 drop Right Eye Daily         No current [facility]-ordered outpatient medications on file.             Review of Systems:     The 10 point Review of Systems is negative other than noted in the HPI.            Data:   Data   Results for orders placed or performed during the hospital encounter of 07/24/22 (from the past 24 hour(s))   [place]  Draw    Narrative    The following orders were created for panel order [place] Draw.  Procedure                               Abnormality         Status                     ---------                               -----------         ------                     Extra Blood Culture Bottle[[id number]]                       Final result               Extra Blue Top Tube[[id number]]                              Final result               Extra Green Top (Lithium...[[id number]]                      Final result               Extra Purple Top Tube[[id number]]                            Final result               Extra Green Top (Lithium...[[id number]]                      Final result                 Please view results for these tests on the individual orders.   Extra Blood Culture Bottle   Result Value Ref Range    Hold Specimen JIC    Extra Blue Top Tube   Result Value Ref Range    Hold Specimen JIC    Extra Green Top (Lithium Heparin) Tube   Result Value Ref Range    Hold Specimen JIC    Extra Purple Top Tube   Result Value Ref Range    Hold Specimen JIC    Extra Green Top (Lithium Heparin) ON ICE   Result Value Ref Range    Hold Specimen JIC    CBC with platelets differential    Narrative    The following orders were created for panel order CBC with platelets differential.  Procedure                               Abnormality         Status                     ---------                               -----------         ------                     CBC with platelets and d...[[id number]]  Abnormal            Final result                 Please view results for these tests on the individual orders.   Comprehensive metabolic panel   Result Value Ref Range    Sodium 136 133 - 144 mmol/L    Potassium 4.0 3.4 - 5.3 mmol/L    Chloride 105 94 - 109 mmol/L    Carbon Dioxide (CO2) 23 20 - 32 mmol/L    Anion Gap 8 3 - 14 mmol/L    Urea Nitrogen 25 7 - 30 mg/dL    Creatinine 0.58 (L) 0.66 - 1.25 mg/dL    Calcium 9.8 8.5 - 10.1 mg/dL     Glucose 180 (H) 70 - 99 mg/dL    Alkaline Phosphatase 759 (H) 40 - 150 U/L     (H) 0 - 45 U/L    ALT 33 0 - 70 U/L    Protein Total 7.0 6.8 - 8.8 g/dL    Albumin 1.9 (L) 3.4 - 5.0 g/dL    Bilirubin Total 2.2 (H) 0.2 - 1.3 mg/dL    GFR Estimate >90 >60 mL/min/1.73m2   D dimer quantitative   Result Value Ref Range    D-Dimer Quantitative 3.48 (H) 0.00 - 0.50 ug/mL FEU    Narrative    This D-dimer assay is intended for use in conjunction with a clinical pretest probability assessment model to exclude pulmonary embolism (PE) and deep venous thrombosis (DVT) in outpatients suspected of PE or DVT. The cut-off value is 0.50 ug/mL FEU.   Troponin I   Result Value Ref Range    Troponin I High Sensitivity 5 <79 ng/L   Lipase   Result Value Ref Range    Lipase 112 73 - 393 U/L   CBC with platelets and differential   Result Value Ref Range    WBC Count 11.2 (H) 4.0 - 11.0 10e3/uL    RBC Count 4.42 4.40 - 5.90 10e6/uL    Hemoglobin 14.7 13.3 - 17.7 g/dL    Hematocrit 45.7 40.0 - 53.0 %     (H) 78 - 100 fL    MCH 33.3 (H) 26.5 - 33.0 pg    MCHC 32.2 31.5 - 36.5 g/dL    RDW 17.2 (H) 10.0 - 15.0 %    Platelet Count 279 150 - 450 10e3/uL    % Neutrophils 72 %    % Lymphocytes 19 %    % Monocytes 6 %    % Eosinophils 1 %    % Basophils 1 %    % Immature Granulocytes 1 %    NRBCs per 100 WBC 0 <1 /100    Absolute Neutrophils 8.1 1.6 - 8.3 10e3/uL    Absolute Lymphocytes 2.1 0.8 - 5.3 10e3/uL    Absolute Monocytes 0.7 0.0 - 1.3 10e3/uL    Absolute Eosinophils 0.1 0.0 - 0.7 10e3/uL    Absolute Basophils 0.1 0.0 - 0.2 10e3/uL    Absolute Immature Granulocytes 0.1 <=0.4 10e3/uL    Absolute NRBCs 0.0 10e3/uL   CT Chest (PE) Abdomen Pelvis w Contrast   Result Value Ref Range    Radiologist flags Metastatic disease, tumor thrombus (Urgent)     Narrative    EXAM: CT CHEST PE ABDOMEN PELVIS W CONTRAST  LOCATION: [facility]  DATE/TIME: 7/24/2022 4:53 PM    INDICATION: Increased shortness of breath,  elevated dimer, rule out PE, increased abdominal distention, history of liver cancer  COMPARISON: None.  TECHNIQUE: CT chest pulmonary angiogram and routine CT abdomen pelvis with IV contrast. Arterial phase through the chest and venous phase through the abdomen and pelvis. Multiplanar reformats and MIP reconstructions were performed. Dose reduction   techniques were used.   CONTRAST: 106mL Isovue 370        FINDINGS:  ANGIOGRAM CHEST: Pulmonary arteries are normal caliber and negative for pulmonary emboli. Thoracic aorta is negative for dissection. No CT evidence of right heart strain.     LUNGS AND PLEURA: Numerous (at least 20) bilateral pulmonary nodules. For example:  -Anterior right upper lobe, 1.9 x 1.7 cm (11/101)  -Left upper lobe laterally, 1.1 x 1.1 cm (11/93).    Small bilateral pleural effusions, right greater than left.    MEDIASTINUM/AXILLAE: Enlarged prevascular lymph node measuring 1.8 x 1.3 cm (9/100) and right hilar lymph node measuring 3.3 x 3.0 cm (9/114).    CORONARY ARTERY CALCIFICATION: Previous intervention (stents or CABG).    HEPATOBILIARY: Cirrhotic morphology with innumerable masses involving almost the entire right lobe. The largest mass in the inferior right lobe measures 13.5 x 11.3 cm (6/78). Expansile soft tissue involving the entire right portal vein, which extends   centrally into the main portal vein and proximal portion of the left portal vein (e.g. 6/54). Hepatic veins are not well visualized.    Small volume ascites.    PANCREAS: Calcifications in the head suggesting chronic pancreatitis.    SPLEEN: Enlarged, 16.9 cm craniocaudal.    ADRENAL GLANDS: 1.5 x 1.1 cm left adrenal nodule (6/78). Normal right adrenal.    KIDNEYS/BLADDER: Low-attenuation lesion in the lower pole right kidney measures just above fluid attenuation, probably a minimally complex cyst.    BOWEL: Normal.    LYMPH NODES: No lymphadenopathy.    VASCULATURE: No abdominal aortic aneurysm. Mild atherosclerotic  calcification.    PELVIC ORGANS: Normal.    MUSCULOSKELETAL: Partially healed right eighth rib fracture posteriorly. No aggressive or destructive lesions.      Impression    IMPRESSION:  1.  No acute pulmonary embolism or secondary signs of right heart strain.    2.  Morphologic changes of cirrhosis with the sequelae of portal hypertension, including splenomegaly and small volume ascites.    3.  Extensive tumor involvement of the entire right hepatic lobe with reported history of hepatocellular carcinoma. There is extends tumor involvement of the portal veins with tumor in vein involving all of the right portal veins and extending centrally   into the main portal vein and proximal left portal vein.    4.  Widespread metastatic disease involving the lungs, thoracic lymph nodes, and left adrenal gland.    5.  Small bilateral pleural effusions.    [Urgent Result: Metastatic disease, tumor thrombus]    Finding was identified on 7/24/2022 5:18 PM.     Dr. Tone Rogers was contacted by me on 7/24/2022 5:39 PM and verbalized understanding of the critical result.     EKG 12 lead   Result Value Ref Range    Systolic Blood Pressure  mmHg    Diastolic Blood Pressure  mmHg    Ventricular Rate 93 BPM    Atrial Rate 93 BPM    AZ Interval 172 ms    QRS Duration 74 ms     ms    QTc 430 ms    P Axis 45 degrees    R AXIS 11 degrees    T Axis 50 degrees    Interpretation ECG       Sinus rhythm with occasional Premature ventricular complexes  Low voltage QRS  Nonspecific T wave abnormality  Abnormal ECG  No previous ECGs available  Confirmed by GENERATED REPORT, COMPUTER ([id number]),  Aasen, Bradley ([id number]) on 7/25/2022 11:45:19 AM     Asymptomatic COVID-19 Virus (Coronavirus) by PCR Nasopharyngeal    Specimen: Nasopharyngeal; Swab   Result Value Ref Range    SARS CoV2 PCR Negative Negative    Narrative    Testing was performed using the Xpert Xpress SARS-CoV-2 Assay on the   [employer].  Additional information about   this Emergency Use Authorization (EUA) assay can be found via the Lab   Guide. This test should be ordered for the detection of SARS-CoV-2 in   individuals who meet SARS-CoV-2 clinical and/or epidemiological   criteria. Test performance is unknown in asymptomatic patients. This   test is for in vitro diagnostic use under the FDA EUA for   laboratories certified under CLIA to perform high complexity testing.   This test has not been FDA cleared or approved. A negative result   does not rule out the presence of PCR inhibitors in the specimen or   target RNA in concentration below the limit of detection for the   assay. The possibility of a false negative should be considered if   the patient's recent exposure or clinical presentation suggests   COVID-19. This test was validated by the [facility]. This laboratory is certified under the Clinical Laboratory Improvement Amendments of 1988 (CLIA-88) as qualified to perform high complexity laboratory testing.     CBC with platelets   Result Value Ref Range    WBC Count 13.3 (H) 4.0 - 11.0 10e3/uL    RBC Count 4.15 (L) 4.40 - 5.90 10e6/uL    Hemoglobin 13.6 13.3 - 17.7 g/dL    Hematocrit 41.5 40.0 - 53.0 %     78 - 100 fL    MCH 32.8 26.5 - 33.0 pg    MCHC 32.8 31.5 - 36.5 g/dL    RDW 17.3 (H) 10.0 - 15.0 %    Platelet Count 280 150 - 450 10e3/uL   Comprehensive metabolic panel   Result Value Ref Range    Sodium 139 133 - 144 mmol/L    Potassium 4.1 3.4 - 5.3 mmol/L    Chloride 107 94 - 109 mmol/L    Carbon Dioxide (CO2) 22 20 - 32 mmol/L    Anion Gap 10 3 - 14 mmol/L    Urea Nitrogen 29 7 - 30 mg/dL    Creatinine 0.62 (L) 0.66 - 1.25 mg/dL    Calcium 9.6 8.5 - 10.1 mg/dL    Glucose 139 (H) 70 - 99 mg/dL    Alkaline Phosphatase 636 (H) 40 - 150 U/L     (H) 0 - 45 U/L    ALT 28 0 - 70 U/L    Protein Total 6.3 (L) 6.8 - 8.8 g/dL    Albumin 1.8 (L) 3.4 - 5.0 g/dL    Bilirubin Total 2.9 (H) 0.2 -  1.3 mg/dL    GFR Estimate >90 >60 mL/min/1.73m2

## 2022-07-25 NOTE — PROGRESS NOTES
[facility]    Hospitalist Progress Note    Interval History   Patient is awake and alert.  Appears very fatigued.  Minimal oral intake.  Mouth appears very dry.  Answers yes or no to most questions but does not elaborate further.  Other than generalized weakness he has no focal complaints at this time.    -Data reviewed today: I reviewed all new labs and imaging results over the last 24 hours. I personally reviewed no images or EKG's today.    Physical Exam   Temp: 97.6  F (36.4  C) Temp src: Axillary BP: 129/77 Pulse: 102   Resp: 16 SpO2: 95 % O2 Device: None (Room air)    Vitals:    07/24/22 1538 07/24/22 2022   Weight: 77.1 kg (169 lb 15.6 oz) 77.1 kg (169 lb 15.6 oz)     Vital Signs with Ranges  Temp:  [97.5  F (36.4  C)-97.9  F (36.6  C)] 97.6  F (36.4  C)  Pulse:  [] 102  Resp:  [16] 16  BP: (125-151)/(74-84) 129/77  SpO2:  [93 %-96 %] 95 %  No intake/output data recorded.    Physical Exam  Constitutional:       Appearance: He is ill-appearing.      Comments: Thin and weak   HENT:      Head: Normocephalic.   Eyes:      Pupils: Pupils are equal, round, and reactive to light.   Cardiovascular:      Rate and Rhythm: Normal rate and regular rhythm.      Pulses: Normal pulses.      Heart sounds: Normal heart sounds.   Pulmonary:      Effort: Pulmonary effort is normal. No respiratory distress.      Breath sounds: Normal breath sounds.   Abdominal:      General: There is distension.      Tenderness: There is no abdominal tenderness. There is no guarding.      Comments: Diffusely distended with minimal tenderness    Musculoskeletal:         General: Normal range of motion.      Cervical back: Normal range of motion.      Right lower leg: Edema present.      Left lower leg: Edema present.   Skin:     General: Skin is warm and dry.   Neurological:      General: No focal deficit present.   Psychiatric:         Mood and Affect: Mood normal.           Medications       sodium chloride (PF)   3 mL Intracatheter Q8H       Data   Recent Labs   Lab 07/25/22  0734 07/24/22  1551   WBC 13.3* 11.2*   HGB 13.6 14.7    103*    279    136   POTASSIUM 4.1 4.0   CHLORIDE 107 105   CO2 22 23   BUN 29 25   CR 0.62* 0.58*   ANIONGAP 10 8   JENNYFER 9.6 9.8   * 180*   ALBUMIN 1.8* 1.9*   PROTTOTAL 6.3* 7.0   BILITOTAL 2.9* 2.2*   ALKPHOS 636* 759*   ALT 28 33   * 231*   LIPASE  --  112       Recent Results (from the past 24 hour(s))   CT Chest (PE) Abdomen Pelvis w Contrast   Result Value    Radiologist flags Metastatic disease, tumor thrombus (Urgent)    Narrative    EXAM: CT CHEST PE ABDOMEN PELVIS W CONTRAST  LOCATION: [facility]  DATE/TIME: 7/24/2022 4:53 PM    INDICATION: Increased shortness of breath, elevated dimer, rule out PE, increased abdominal distention, history of liver cancer  COMPARISON: None.  TECHNIQUE: CT chest pulmonary angiogram and routine CT abdomen pelvis with IV contrast. Arterial phase through the chest and venous phase through the abdomen and pelvis. Multiplanar reformats and MIP reconstructions were performed. Dose reduction   techniques were used.   CONTRAST: 106mL Isovue 370        FINDINGS:  ANGIOGRAM CHEST: Pulmonary arteries are normal caliber and negative for pulmonary emboli. Thoracic aorta is negative for dissection. No CT evidence of right heart strain.     LUNGS AND PLEURA: Numerous (at least 20) bilateral pulmonary nodules. For example:  -Anterior right upper lobe, 1.9 x 1.7 cm (11/101)  -Left upper lobe laterally, 1.1 x 1.1 cm (11/93).    Small bilateral pleural effusions, right greater than left.    MEDIASTINUM/AXILLAE: Enlarged prevascular lymph node measuring 1.8 x 1.3 cm (9/100) and right hilar lymph node measuring 3.3 x 3.0 cm (9/114).    CORONARY ARTERY CALCIFICATION: Previous intervention (stents or CABG).    HEPATOBILIARY: Cirrhotic morphology with innumerable masses involving almost the entire right lobe. The largest  mass in the inferior right lobe measures 13.5 x 11.3 cm (6/78). Expansile soft tissue involving the entire right portal vein, which extends   centrally into the main portal vein and proximal portion of the left portal vein (e.g. 6/54). Hepatic veins are not well visualized.    Small volume ascites.    PANCREAS: Calcifications in the head suggesting chronic pancreatitis.    SPLEEN: Enlarged, 16.9 cm craniocaudal.    ADRENAL GLANDS: 1.5 x 1.1 cm left adrenal nodule (6/78). Normal right adrenal.    KIDNEYS/BLADDER: Low-attenuation lesion in the lower pole right kidney measures just above fluid attenuation, probably a minimally complex cyst.    BOWEL: Normal.    LYMPH NODES: No lymphadenopathy.    VASCULATURE: No abdominal aortic aneurysm. Mild atherosclerotic calcification.    PELVIC ORGANS: Normal.    MUSCULOSKELETAL: Partially healed right eighth rib fracture posteriorly. No aggressive or destructive lesions.      Impression    IMPRESSION:  1.  No acute pulmonary embolism or secondary signs of right heart strain.    2.  Morphologic changes of cirrhosis with the sequelae of portal hypertension, including splenomegaly and small volume ascites.    3.  Extensive tumor involvement of the entire right hepatic lobe with reported history of hepatocellular carcinoma. There is extends tumor involvement of the portal veins with tumor in vein involving all of the right portal veins and extending centrally   into the main portal vein and proximal left portal vein.    4.  Widespread metastatic disease involving the lungs, thoracic lymph nodes, and left adrenal gland.    5.  Small bilateral pleural effusions.    [Urgent Result: Metastatic disease, tumor thrombus]    Finding was identified on 7/24/2022 5:18 PM.     Dr. Tone Rogers was contacted by me on 7/24/2022 5:39 PM and verbalized understanding of the critical result.           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  CT chest for PE negative for pulmonary embolism.  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  Patient is being mainly admitted for failure to thrive and progressive weakness.     #1 progressive fatigue with failure to thrive with severe malnutrition with dehydration  Hypoproteinemia  -Likely from his underlying malignancy.  Unclear prognosis at this time.  -[place] [facility] consulted.  -Patient has previous known history of portal vein tumor thrombosis.  No need for anticoagulation at this time.  Has chronic cirrhosis.  -Nutrition services consulted.  Dietary evaluation was not done since he is under observation status.  -Likely has underlying severe protein calorie malnutrition.  -No obvious source of infection.  Was recently treated for sepsis of unclear source.  Currently has 3 more days left of his Flagyl and cefuroxime.  We will continue this and finish the course.  -Has loose stools but only once a day.  Unlikely for this to be C. Difficile.  -Started on gentle IV fluids normal saline 75 cc an hour due to his clinical signs of dehydration  -PT OT consulted.  Occupational Therapy evaluated him and recommended TCU placement.   consulted.    Family is interested in palliative care and possible hospice.  Will await for oncology to talk to the family and the patient before consulting palliative care.    2.  Metastatic hepatocellular cancer-currently on immunotherapy.  Plan for possible radiation treatment of the local mass.  CT abdomen pelvis done during this admission shows extensive metastatic disease.  Likely a progression from his previous presentation.  [place] [facility] consulted.    Await recommendations and discussions regarding  goals of care.  covid negative    3.  Leukocytosis-unclear source.  He is already on antibiotics.  Was recently treated for sepsis of unknown origin.  And CT chest abdomen pelvis yesterday did not show any obvious infection but it did show metastatic disease and this could be stress response.  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      DVT Prophylaxis: Pneumatic Compression Devices  Code Status: No CPR- Do NOT Intubate  Disposition: Expected discharge      Isabela Masterson MD, MD  [phone](p)  [phone]( c)

## 2022-07-25 NOTE — UTILIZATION REVIEW
"[facility] Utilization Review  Admission Status; Secondary Review Determination     Admission Date: 7/24/2022  3:35 PM      Under the authority of the Utilization Management Committee, the utilization review process indicated a secondary review on the above patient.  The review outcome is based on review of the medical records, discussions with staff, and applying clinical experience noted on the date of the review.        (X) Observation Status Appropriate - This patient does not meet hospital inpatient criteria and is placed in observation status. If this patient's primary payer is Medicare and was admitted as an inpatient, Condition Code 44 should be used and patient status changed to \"observation\".   () Observation Status concurrent Review           RATIONALE FOR DETERMINATION   82-year-old male with history of metastatic hepatocellular carcinoma, monoclonal B-cell lymphocytosis, progressed to CLL, hypertension and diabetes, admitted with progressively worsening weakness and abdominal pain, shortness of breath.  CT chest abdomen negative for PE, but shows persistent hepatocellular cancer with portal vein thrombosis, extensive tumor involvement with known hepatocellular carcinoma and widespread metastatic disease.  Patient has failure to thrive from severe malnutrition with underlying malignancy, nutrition services placed on regular diet with supplements, had recent infection and continued on PTA antibiotics.  Unclear metastatic involvement as prior scans not available in epic, oncology consult pending, no fevers, but patient has tachycardia and mild leukocytosis.  No signs of infection, currently has severe malnutrition with failure to thrive, no acute medical issues at this time, does not meet criteria for inpatient stay, recommend continue observation status      The severity of illness, intensity of service provided, expected LOS make the care appropriate for observation status at this time.      "   The information on this document is developed by the utilization review team in order for the business office to ensure compliance.  This only denotes the appropriateness of proper admission status and does not reflect the quality of care rendered.              Sincerely,       Karen Jaramillo MD  Physician Advisor  Utilization Review-[place]    Phone: [phone]

## 2022-07-25 NOTE — PROGRESS NOTES
07/25/22 0841   Quick Adds   Type of Visit Initial Occupational Therapy Evaluation   Living Environment   People in Home spouse;child(alena), adult  (spouse-[place]; daughter works from home)   Current Living Arrangements house   Home Accessibility stairs to enter home   Number of Stairs, Main Entrance 2  (with railing)   Living Environment Comments Pt can stay on main level of home; has a tub/shower with no grab bars   Self-Care   Usual Activity Tolerance fair   Current Activity Tolerance poor   Equipment Currently Used at Home walker, rolling   Fall history within last six months yes   Number of times patient has fallen within last six months 1   Activity/Exercise/Self-Care Comment Pt reports lately his wife and daughter have been helping with ADL   General Information   Onset of Illness/Injury or Date of Surgery 07/24/22   Referring Physician Isabela Masterson MD   Patient/Family Therapy Goal Statement (OT) Pt [facility]   Additional Occupational Profile Info/Pertinent History of Current Problem Tani Tejeda is a 82 year old male with a history of metastatic hepatocellular cancer, 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  CT chest for PE negative for pulmonary embolism.  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  Patient is being mainly admitted for failure to thrive and progressive weakness.   Existing Precautions/Restrictions fall   Cognitive Status Examination   Orientation Status orientation to person, place and time   Cognitive Status Comments slow to respond, but responds appropriately. Dry mouth making it difficult to verbalize   Sensory   Sensory Comments baseline neuropathy in B feet   Pain Assessment   Patient Currently in Pain Yes, see Vital Sign flowsheet  (back pain)   Range of Motion  Comprehensive   Comment, General Range of Motion BUE AROM intact   Strength Comprehensive (MMT)   Comment, General Manual Muscle Testing (MMT) Assessment generally weak; 4/5 BUE and 2/5 in BLE-unable to lift LEs unassisted   Muscle Tone Assessment   Muscle Tone Quick Adds No deficits were identified   Coordination   Coordination Comments able to oppose digits to thumb except difficulty on R hand due to arthritis per pt   Bed Mobility   Comment (Bed Mobility) Max A supine<>EOB   Transfers   Transfer Comments Pt not feeling ready to try standing with assist due to pain and weakness. Anticipate he would need a lift at this time   Balance   Balance Comments SBA to sit EOB   Activities of Daily Living   BADL Assessment/Intervention lower body dressing;toileting   Lower Body Dressing Assessment/Training   [place] Level (Lower Body Dressing) dependent (less than 25% patient effort)   Toileting   [place] Level (Toileting) dependent (less than 25% patient effort)   Clinical Impression   Criteria for Skilled Therapeutic Interventions Met (OT) Yes, treatment indicated   OT Diagnosis Impaired ADL and mobility   OT Problem List-Impairments impacting ADL problems related to;activity tolerance impaired;balance;mobility;strength;pain;sensation   Assessment of Occupational Performance 5 or more Performance Deficits   Identified Performance Deficits all ADL, IADL, mobility   Planned Therapy Interventions (OT) ADL retraining;transfer training;progressive activity/exercise;risk factor education;home program guidelines;strengthening;cognition   Clinical Decision Making Complexity (OT) moderate complexity   Anticipated Equipment Needs Upon Discharge (OT) walker, rolling;toileting equipment;bathing equipment   Risk & Benefits of therapy have been explained evaluation/treatment results reviewed;care plan/treatment goals reviewed;risks/benefits reviewed;current/potential barriers reviewed;participants voiced agreement with care  plan;participants included;patient   OT Discharge Planning   OT Discharge Recommendation (DC Rec) Transitional Care Facility   OT Rationale for DC Rec Pt significantly below baseline with ADL and mobility. Pt was able to sit EOB today but needs Max A for supine<>EOB and unable to tolerate standing. Pt has support of wife and daughter at home, but he would need to be ambulatory and able to transfer with little to no assist to return home.   OT Brief overview of current status RN staff will need A of 2/lift; should be moved to lift room if able.   Total Evaluation Time (Minutes)   Total Evaluation Time (Minutes) 20   OT Goals   Therapy Frequency (OT) 5 times/wk   OT Predicted Duration/Target Date for Goal Attainment 08/08/22   OT Goals Hygiene/Grooming;Upper Body Dressing;Lower Body Dressing;Cognition;Toilet Transfer/Toileting;OT Goal 1   OT: Hygiene/Grooming supervision/stand-by assist;from wheelchair   OT: Upper Body Dressing Supervision/stand-by assist;from wheelchair   OT: Lower Body Dressing Minimal assist   OT: Toilet Transfer/Toileting Minimal assist;cleaning and garment management;using adaptive equipment  (commode)   OT: Cognitive Patient/caregiver will verbalize understanding of cognitive assessment results/recommendations as needed for safe discharge planning   OT: Goal 1 Pt will be SBA or less with BUE HEP to maximize strength and activity tolerance for ADL

## 2022-07-25 NOTE — PROGRESS NOTES
"Patient is a 82 year old male with a history of stage IV liver cancer, hypertension and diabetes. Admitted to hospital for general weakness and failure to thrive. A & O x 4.  Not able to ambulate.   VSS on RA.  LS diminished.  Voice soft.  Left PIV SL in left arm.  Incontinent of bowel and bladder.  Urinal and bedpan in room.  Last BM  7/24 in AM.  Skin WDL.  Refused to wear hospital gown.  Sleeping in pull up brief.  Denies pain and nausea.  Attempted to collect sample for UA but patient stated he could not urinate and would do so tomorrow.  On reg. Diet but states he cannot eat because \"food tastes terrible.\"   Possible transition to hospice care tomorrow.    "

## 2022-07-25 NOTE — PROGRESS NOTES
07/25/22 1100   Quick Adds   Type of Visit Initial PT Evaluation   Living Environment   People in Home spouse;child(alena), adult   Current Living Arrangements house   Home Accessibility stairs to enter home   Number of Stairs, Main Entrance 2   Transportation Anticipated family or friend will provide   Living Environment Comments Pt has all needs on main level   Self-Care   Usual Activity Tolerance fair   Current Activity Tolerance poor   Equipment Currently Used at Home raised toilet seat;walker, standard   Fall history within last six months yes   Number of times patient has fallen within last six months 1   Activity/Exercise/Self-Care Comment Previously IND, reports wife and daughter have been assisting with many cares   General Information   Onset of Illness/Injury or Date of Surgery 07/24/22   Referring Physician Isabela Masterson MD   Patient/Family Therapy Goals Statement (PT) Increase strength and ease of transfers   Pertinent History of Current Problem (include personal factors and/or comorbidities that impact the POC) 82 year old male with a history of metastatic hepatocellular cancer, 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  CT chest for PE negative for pulmonary embolism.  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  Patient is being mainly admitted for failure to thrive and progressive weakness.   Cognition   Affect/Mental Status (Cognition) WNL   Orientation Status (Cognition) oriented x 4   Follows Commands (Cognition) WNL   Pain Assessment   Patient Currently in Pain No   Range of Motion (ROM)   ROM Comment Decreased ROM and stiffness in all extremeties   Strength Comprehensive (MMT)   Comment, General Manual Muscle Testing (MMT) Assessment Generalized weakness, 3/5 BUE, 2-/5 BLE - unable to lift LEs  unassisted   Bed Mobility   Comment, (Bed Mobility) maxA x 2 supine-sit   Transfers   Comment, (Transfers) Not fully assessed; pt too weak and tired to perform at this session - may benefit from lift   Gait/Stairs (Locomotion)   Comment, (Gait/Stairs) Not assessed; pt unable to perform due to generalized weakness   Balance   Balance Comments Pt SBA sitting EOB; no LOB   Clinical Impression   Criteria for Skilled Therapeutic Intervention Yes, treatment indicated   PT Diagnosis (PT) Decreased functional mobilit y   Influenced by the following impairments decreased activity tolerance, generalized weakness, decreased ROM, decreased motivation   Functional limitations due to impairments decreased functional mobility   Clinical Presentation (PT Evaluation Complexity) Stable/Uncomplicated   Clinical Presentation Rationale Clinical judgement   Clinical Decision Making (Complexity) low complexity   Planned Therapy Interventions (PT) balance training;bed mobility training;patient/family education;strengthening;transfer training;progressive activity/exercise;home program guidelines   Risk & Benefits of therapy have been explained evaluation/treatment results reviewed;care plan/treatment goals reviewed;risks/benefits reviewed;current/potential barriers reviewed;participants voiced agreement with care plan;participants included;patient;spouse/significant other   PT Discharge Planning   PT Discharge Recommendation (DC Rec) home with home care physical therapy;Long term care facility;home with assist;Transitional Care Facility   PT Rationale for DC Rec Pt significantly below baseline with all functional mobility. Currently requires maxAx2 for bed mobility. Per family and chart review, pending discussion for hospice care. Anticipate discharge from hospital pt would require increased level of care, and would benefit from continued skilled PT to maintain current strength and function to assist caregivers with transfers. Family  considering hospice care - pending decisions.   PT Brief overview of current status maxA x 2 bed mobility   Total Evaluation Time   Total Evaluation Time (Minutes) 4   Physical Therapy Goals   PT Frequency 3x/week   PT Predicted Duration/Target Date for Goal Attainment 08/01/22   PT Goals Bed Mobility;Transfers   PT: Bed Mobility Moderate assist;Rolling;Supine to/from sit   PT: Transfers Moderate assist;Sit to/from stand

## 2022-07-25 NOTE — CONSULTS
CLINICAL NUTRITION SERVICES - BRIEF NOTE    Consult received for malnutrition    A full Nutrition Assessment will be deferred at this time as patient is currently observation status.      Pt can be referred to outpatient RD by primary care provider after discharge as appropriate.      Should patient's status change to Inpatient, we will be available for a full Nutrition Assessment with a consult.     - reviewed pt's recent nutrition history and anthropometrics --> ordered oral nutrition supplements IRINA Jacobson RDN

## 2022-07-26 VITALS
DIASTOLIC BLOOD PRESSURE: 73 MMHG | SYSTOLIC BLOOD PRESSURE: 133 MMHG | OXYGEN SATURATION: 96 % | HEIGHT: 67 IN | WEIGHT: 181.44 LBS | RESPIRATION RATE: 16 BRPM | TEMPERATURE: 97.5 F | HEART RATE: 99 BPM | BODY MASS INDEX: 28.48 KG/M2

## 2022-07-26 LAB
AFP SERPL-MCNC: ABNORMAL NG/ML
AMMONIA PLAS-SCNC: <10 UMOL/L (ref 10–50)
INR PPP: 1.45 (ref 0.85–1.15)

## 2022-07-26 PROCEDURE — 250N000013 HC RX MED GY IP 250 OP 250 PS 637: Performed by: HOSPITALIST

## 2022-07-26 PROCEDURE — 82105 ALPHA-FETOPROTEIN SERUM: CPT | Performed by: HOSPITALIST

## 2022-07-26 PROCEDURE — 258N000003 HC RX IP 258 OP 636: Performed by: HOSPITALIST

## 2022-07-26 PROCEDURE — 120N000001 HC R&B MED SURG/OB

## 2022-07-26 PROCEDURE — 82140 ASSAY OF AMMONIA: CPT | Performed by: INTERNAL MEDICINE

## 2022-07-26 PROCEDURE — 36415 COLL VENOUS BLD VENIPUNCTURE: CPT | Performed by: INTERNAL MEDICINE

## 2022-07-26 PROCEDURE — 85610 PROTHROMBIN TIME: CPT | Performed by: INTERNAL MEDICINE

## 2022-07-26 PROCEDURE — 99232 SBSQ HOSP IP/OBS MODERATE 35: CPT | Performed by: HOSPITALIST

## 2022-07-26 RX ORDER — MORPHINE SULFATE 20 MG/ML
5-10 SOLUTION ORAL
Status: DISCONTINUED | OUTPATIENT
Start: 2022-07-26 | End: 2022-07-27 | Stop reason: HOSPADM

## 2022-07-26 RX ORDER — NALOXONE HYDROCHLORIDE 0.4 MG/ML
0.2 INJECTION, SOLUTION INTRAMUSCULAR; INTRAVENOUS; SUBCUTANEOUS
Status: DISCONTINUED | OUTPATIENT
Start: 2022-07-26 | End: 2022-07-27 | Stop reason: HOSPADM

## 2022-07-26 RX ORDER — MORPHINE SULFATE 10 MG/5ML
5-10 SOLUTION ORAL
Status: DISCONTINUED | OUTPATIENT
Start: 2022-07-26 | End: 2022-07-27 | Stop reason: HOSPADM

## 2022-07-26 RX ORDER — NALOXONE HYDROCHLORIDE 0.4 MG/ML
0.4 INJECTION, SOLUTION INTRAMUSCULAR; INTRAVENOUS; SUBCUTANEOUS
Status: DISCONTINUED | OUTPATIENT
Start: 2022-07-26 | End: 2022-07-26

## 2022-07-26 RX ORDER — LORAZEPAM 2 MG/ML
1 INJECTION INTRAMUSCULAR
Status: DISCONTINUED | OUTPATIENT
Start: 2022-07-26 | End: 2022-07-27 | Stop reason: HOSPADM

## 2022-07-26 RX ORDER — ONDANSETRON 2 MG/ML
4 INJECTION INTRAMUSCULAR; INTRAVENOUS EVERY 6 HOURS PRN
Status: DISCONTINUED | OUTPATIENT
Start: 2022-07-26 | End: 2022-07-27 | Stop reason: HOSPADM

## 2022-07-26 RX ORDER — NALOXONE HYDROCHLORIDE 0.4 MG/ML
0.2 INJECTION, SOLUTION INTRAMUSCULAR; INTRAVENOUS; SUBCUTANEOUS
Status: DISCONTINUED | OUTPATIENT
Start: 2022-07-26 | End: 2022-07-26

## 2022-07-26 RX ORDER — NALOXONE HYDROCHLORIDE 0.4 MG/ML
0.1 INJECTION, SOLUTION INTRAMUSCULAR; INTRAVENOUS; SUBCUTANEOUS
Status: DISCONTINUED | OUTPATIENT
Start: 2022-07-26 | End: 2022-07-27 | Stop reason: HOSPADM

## 2022-07-26 RX ORDER — ONDANSETRON 4 MG/1
4 TABLET, ORALLY DISINTEGRATING ORAL EVERY 6 HOURS PRN
Status: DISCONTINUED | OUTPATIENT
Start: 2022-07-26 | End: 2022-07-27 | Stop reason: HOSPADM

## 2022-07-26 RX ORDER — SALIVA STIMULANT COMB. NO.3
1 SPRAY, NON-AEROSOL (ML) MUCOUS MEMBRANE
Status: DISCONTINUED | OUTPATIENT
Start: 2022-07-26 | End: 2022-07-27 | Stop reason: HOSPADM

## 2022-07-26 RX ORDER — LORAZEPAM 0.5 MG/1
1 TABLET ORAL
Status: DISCONTINUED | OUTPATIENT
Start: 2022-07-26 | End: 2022-07-27 | Stop reason: HOSPADM

## 2022-07-26 RX ORDER — OXYCODONE HYDROCHLORIDE 5 MG/1
5 TABLET ORAL EVERY 4 HOURS PRN
Status: DISCONTINUED | OUTPATIENT
Start: 2022-07-26 | End: 2022-07-27 | Stop reason: HOSPADM

## 2022-07-26 RX ADMIN — CEFUROXIME AXETIL 500 MG: 500 TABLET ORAL at 09:13

## 2022-07-26 RX ADMIN — MORPHINE SULFATE 10 MG: 10 SOLUTION ORAL at 19:20

## 2022-07-26 RX ADMIN — MORPHINE SULFATE 10 MG: 10 SOLUTION ORAL at 13:49

## 2022-07-26 RX ADMIN — MORPHINE SULFATE 10 MG: 10 SOLUTION ORAL at 15:05

## 2022-07-26 RX ADMIN — OXYCODONE HYDROCHLORIDE 5 MG: 5 TABLET ORAL at 10:17

## 2022-07-26 RX ADMIN — METRONIDAZOLE 500 MG: 500 TABLET, FILM COATED ORAL at 09:13

## 2022-07-26 RX ADMIN — ASPIRIN 81 MG: 81 TABLET, COATED ORAL at 09:12

## 2022-07-26 RX ADMIN — MORPHINE SULFATE 10 MG: 10 SOLUTION ORAL at 17:59

## 2022-07-26 RX ADMIN — SODIUM CHLORIDE: 9 INJECTION, SOLUTION INTRAVENOUS at 13:49

## 2022-07-26 ASSESSMENT — ACTIVITIES OF DAILY LIVING (ADL)
ADLS_ACUITY_SCORE: 60

## 2022-07-26 NOTE — CONSULTS
Care Management Initial Consult    General Information  Assessment completed with: Patient, Family, Children, Spouse or significant other, Spouse, Daughters Elian  Type of CM/SW Visit: Offer D/C Planning    Primary Care Provider verified and updated as needed:     Readmission within the last 30 days:        Reason for Consult: discharge planning, end of life/hospice  Advance Care Planning: None on file.         Communication Assessment  Patient's communication style: spoken language (English or Bilingual)    Hearing Difficulty or Deaf: no   Wear Glasses or Blind: yes    Cognitive  Cognitive/Neuro/Behavioral: .WDL except, speech  Level of Consciousness: alert  Arousal Level: opens eyes spontaneously  Orientation: oriented x 4  Mood/Behavior: calm  Best Language: 0 - No aphasia  Speech: slow    Living Environment:   People in home: spouse, child(alena), adult  Spouse and daughter Jennifer  Current living Arrangements: house      Able to return to prior arrangements: yes  Living Arrangement Comments: Lives with spouse and daughter    Family/Social Support:  Care provided by: spouse/significant other, child(alena)  Provides care for: no one  Marital Status:   Wife, Partner  Marion       Description of Support System: Supportive, Involved    Support Assessment: Adequate family and caregiver support, Adequate social supports    Current Resources:   Patient receiving home care services:  Was receiving Home Care with [facility]. SW received call from Char with [facility] ([phone]) confirming that patient was previously enrolled in their services     Community Resources:  [facility]  Equipment currently used at home: raised toilet seat, walker, standard  Supplies currently used at home:      Employment/Financial:  Employment Status: retired        Financial Concerns:             Lifestyle & Psychosocial Needs:  Social Determinants of Health     Tobacco Use: Not on file   Alcohol Use: Not on file    Financial Resource Strain: Not on file   Food Insecurity: Not on file   Transportation Needs: Not on file   Physical Activity: Not on file   Stress: Not on file   Social Connections: Not on file   Intimate Partner Violence: Not on file   Depression: Not on file   Housing Stability: Not on file       Functional Status:  Prior to admission patient needed assistance:    Patient resides at home with spouse and daughter Jennifer.  Jennifer reports that she works from home during the day but isn't able to assist all day due to her work. SW discussed patient's need for 2 person assist with cares and inquired if they are able to manage cares. Spouse and daughters state that patient would like to discharge to home and they would like to honor that. SW discussed recommendations for private duty services and that hospice can assist with placement if they are not able to manage patient's cares at home.           Mental Health Status: None indicated          Chemical Dependency Status:            None indicated    Values/Beliefs:  Spiritual, Cultural Beliefs, [employer] Practices, Values that affect care:                 Additional Information:  Per patient's physician and care team, patient has expressed a desire to transition to comfort cares and SW was consulted for hospice discussion. SW spoke with patient, spouse and daughter Moo to discuss discharge planning and obtain hospice referrals. SW discussed what hospice is and is not, what services are usually provided, and the variety of places people can get hospice care (along with financial responsibilities). Patient states that he would like to discharge to home. SW provided a list of hospice agencies to patient and family. Patient and family stated that they would be in agreement with using [facility].  SW sent referral to [facility] and updated hospice liaison Navi regarding referral.     Plan for discharge to home tomorrow. KITTY placed call to  Transport  to arrange for a stretcher ride at 10:00 on 7/26. Updated family and they are in agreement. Updated hospice liaison. Updated physician for discharge.     SINDHU Cifuentes, [facility]  [phone]  [facility]

## 2022-07-26 NOTE — PLAN OF CARE
[id number]    A&Ox4. VSS on RA. Denies pain. Regular diet - poor appetite. Took pills with applesauce. Bedrest, T/R q 2 hrs, refuses at times. Incontinent of urine. No BM this shift. R PIV infusing NS @ 75 mL/hr. Discharge pending plan/progress.

## 2022-07-26 NOTE — PLAN OF CARE
Goal Outcome Evaluation:      0700-1930    A&Ox4. Transitioned to comfort care this shift. 10 mg SL morphine given approx q1-2 h per request for pain in RLQ. Regular diet - poor appetite. Assessment and VS deferred. Bedrest, T/R q 2 hrs, refuses at times. Incontinent of b/b. R PIV infusing NS @ 75 mL/hr. SW consult for outpt hospice. Discharge pending hospice availability and pt condition.

## 2022-07-26 NOTE — UTILIZATION REVIEW
Admission Status; Secondary Review Determination    Under the authority of the Utilization Management Committee, the utilization review process indicated a secondary review on the above patient. The review outcome is based on review of the medical records, discussions with staff, and applying clinical experience noted on the date of the review.    (x) Inpatient Status Appropriate - This patient's medical care is consistent with medical management for inpatient care and reasonable inpatient medical practice.    RATIONALE FOR DETERMINATION: 82-year-old male with history of rapidly progressive metastatic hepatocellular cancer, CLL, diabetes who presents to hospital with worsening shortness of breath and profound weakness and anorexia to the point where patient was too weak to get out of bed.  Wife notes patient has been significantly tachypneic, patient is dry oral mucosal with more distended abdomen.  CT imaging revealed extensive tumor involvement in the right hepatic lobe with involvement of the portal veins with tumor all involving the right portal veins and into the main portal vein and proximal left portal vein.  Patient has significant increased size and tumor as well as new hepatomegaly and increased splenomegaly along with right upper abdominal discomfort.  Due to the progressive severity of patient's malignancy patient requiring acute hospitalization for pain control, IV fluids and oncology consultation.  Patient required his treatment for greater than 2 nights with eventual plans due to the severity of patient's poor performance status to transition to hospice prior to discharge.    At the time of admission with the information available to the attending physician more than 2 nights Hospital complex care was anticipated, based on patient risk of adverse outcome if treated as outpatient and complex care required. Inpatient admission is appropriate based on the Medicare guidelines.    This document was  produced using voice recognition software    The information on this document is developed by the utilization review team in order for the business office to ensure compliance. This only denotes the appropriateness of proper admission status and does not reflect the quality of care rendered.    The definitions of Inpatient Status and Observation Status used in making the determination above are those provided in the CMS Coverage Manual, Chapter 1 and Chapter 6, section 70.4.    Sincerely,    Alejandro House MD  Utilization Review  Physician Advisor  [facility].

## 2022-07-26 NOTE — PROGRESS NOTES
[facility]    Hospitalist Progress Note    Interval History   Patient continues to be very fatigued with minimal oral intake.  Would like to proceed with comfort cares and hospice referral.  Does not want any further aggressive treatment.    -Data reviewed today: I reviewed all new labs and imaging results over the last 24 hours. I personally reviewed no images or EKG's today.    Physical Exam   Temp: 97.5  F (36.4  C) Temp src: Oral BP: 133/73 Pulse: 99   Resp: 16 SpO2: 96 % O2 Device: None (Room air)    Vitals:    07/24/22 1538 07/24/22 2022 07/26/22 0604   Weight: 77.1 kg (169 lb 15.6 oz) 77.1 kg (169 lb 15.6 oz) 82.3 kg (181 lb 7 oz)     Vital Signs with Ranges  Temp:  [97.5  F (36.4  C)-97.9  F (36.6  C)] 97.5  F (36.4  C)  Pulse:  [] 99  Resp:  [16] 16  BP: (127-151)/(71-89) 133/73  SpO2:  [95 %-96 %] 96 %  I/O last 3 completed shifts:  In: 75 [P.O.:75]  Out: -     Physical Exam  Constitutional:       Appearance: He is ill-appearing.      Comments: Thin and weak   HENT:      Head: Normocephalic.   Eyes:      Pupils: Pupils are equal, round, and reactive to light.   Cardiovascular:      Rate and Rhythm: Normal rate and regular rhythm.      Pulses: Normal pulses.      Heart sounds: Normal heart sounds.   Pulmonary:      Effort: Pulmonary effort is normal. No respiratory distress.      Breath sounds: Normal breath sounds.   Abdominal:      General: There is distension.      Tenderness: There is no abdominal tenderness. There is no guarding.      Comments: Diffusely distended with minimal tenderness    Musculoskeletal:         General: Normal range of motion.      Cervical back: Normal range of motion.      Right lower leg: Edema present.      Left lower leg: Edema present.   Skin:     General: Skin is warm and dry.   Neurological:      General: No focal deficit present.   Psychiatric:         Mood and Affect: Mood normal.           Medications     sodium chloride 75 mL/hr at  07/25/22 2258       aspirin  81 mg Oral Daily     cefuroxime  500 mg Oral BID     metroNIDAZOLE  500 mg Oral BID     sodium chloride (PF)  3 mL Intracatheter Q8H     timolol maleate  1 drop Right Eye Daily       Data   Recent Labs   Lab 07/26/22  0727 07/25/22  0734 07/24/22  1551   WBC  --  13.3* 11.2*   HGB  --  13.6 14.7   MCV  --  100 103*   PLT  --  280 279   INR 1.45*  --   --    NA  --  139 136   POTASSIUM  --  4.1 4.0   CHLORIDE  --  107 105   CO2  --  22 23   BUN  --  29 25   CR  --  0.62* 0.58*   ANIONGAP  --  10 8   JENNYFER  --  9.6 9.8   GLC  --  139* 180*   ALBUMIN  --  1.8* 1.9*   PROTTOTAL  --  6.3* 7.0   BILITOTAL  --  2.9* 2.2*   ALKPHOS  --  636* 759*   ALT  --  28 33   AST  --  211* 231*   LIPASE  --   --  112       No results found for this or any previous visit (from the past 24 hour(s)).      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  CT chest for PE negative for pulmonary embolism.  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  Patient is being mainly admitted for failure to thrive and progressive weakness.     #1 progressive fatigue with failure to thrive with severe malnutrition with dehydration  Hypoproteinemia  -Likely from his underlying malignancy.  Unclear prognosis at this time.  -[place] oncology consulted.  -Patient has previous known history of portal vein tumor thrombosis.  No need for anticoagulation at this time.  Has chronic cirrhosis.  -Likely has underlying severe protein calorie malnutrition.  -No obvious source of infection.  Was recently treated for sepsis of unclear source.  Currently has 3 more days left of his Flagyl and cefuroxime.  We will continue this and finish the  course.  -Has loose stools but only once a day.  Unlikely for this to be C. Difficile.  -Started on gentle IV fluids normal saline 75 cc an hour due to his clinical signs of dehydration  -Patient currently would like to proceed with hospice referral and would like to be discharged home with hospice.  Palliative care consult-social work consult for hospice referral have both been placed.  -Comfort care orders have been started including sublingual morphine for pain control, Ativan for anxiety and agitation.  -Oncology is in agreement with this plan.    2.  Metastatic hepatocellular cancer-currently on immunotherapy.  Plan for possible radiation treatment of the local mass.  CT abdomen pelvis done during this admission shows extensive metastatic disease.  Likely a progression from his previous presentation.  Oncology team evaluated the patient and had extensive discussions with them.  Decided to proceed with comfort cares at this time.    3.  Leukocytosis-unclear source.  He is already on antibiotics.  Was recently treated for sepsis of unknown origin.  And CT chest abdomen pelvis yesterday did not show any obvious infection but it did show metastatic disease and this could be stress response.  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      DVT Prophylaxis: Pneumatic Compression Devices  Code Status: No CPR- Do NOT Intubate  Disposition: Expected discharge      Isabela Masterson MD, MD  [phone](p)  [phone]( c

## 2022-07-26 NOTE — PROGRESS NOTES
Writer met with patient, spouse and virginia Rodriguez at the bedside. Went over the Medicare LIEBERMAN and answered questions. Patient and his family would like to discuss hospice for discharge.  SW aware and will attempt to connect with patient and family today.      Yamilex Portillo RN, BSN, ACM   Care Transitions Specialist   [facility]  Care Transitions Specialist   [address] [place]. [address]  [email or web address]  Office:[phone] Fax: [phone]  [facility]

## 2022-07-26 NOTE — PROGRESS NOTES
[facility]  [facility]  Hematology/oncology Progress Note            History:   This patient is a 82 year old male with history of HCC with mets to lung who presented to the hospital on 7/24/22 with weakness. The patient was recently seen at OSH for an infection, when he was having temperatures to 104 degrees. Infectious workup was negative, and most likely source was felt to be hepatobiliary / cholangitis. ID was consulted and he was started on flagyl and cefuroxime. He reports that since hospital discharge he has declined to the point where he could not get out of bed on 7/24. He denies any fever recurrence. He has had persistent mild diarrhea.      He is with history of hypertension diabetes and osteoarthritis.  He was found to have high lymphocyte count for couple of years and had peripheral blood smear with immunophenotyping in 2016 which confirmed monoclonal B cell lymphocytosis the absolute number was 2.9 and the cells were kappa restricted CD20 dim coexpression of CD43 CD5 and CD23.  Managed with observation. His lymphocyte count increased to over 5000 in 2019 consistent with early stage CLL    He was diagnosed with hetastatic HCC w extensive tumor thrombus   - poorly differentiated diagnosed in April 2022, size 10 x 8 x 12 cm in the right lobe of the liver with portal vein thrombus possibly tumor thrombus.  - Was evaluated at multidisciplinary liver tumor board, plan was treat with Bevacizumab and Atezolizumab, restage and consider locoregional therapy   - He received 3 cycles Atezo and Marlen, completing 6/24/21  - A CT scan done 7/13/22 at [facility] revealed multiple new metastatic lesions scattered throughout the lungs and in the right hilum. The abdomen shows increase in size of dominant primarily hypodense mass filling the right lobe of the liver resulting in new mild hepatomegaly. Increased splenomegaly, now moderate. New mild ascites.    He is currently hospitalized and has mild right upper  quadrant abdominal pain.  His performance status has declined between last admission to the hospital and this admission he is almost bedridden.  Appetite is down and lost weight.  His albumin is under 2 and had bilirubin over 2 has dark urine.    PMH, SH, FH: Reviewed.         Medications:       aspirin  81 mg Oral Daily     cefuroxime  500 mg Oral BID     metroNIDAZOLE  500 mg Oral BID     sodium chloride (PF)  3 mL Intracatheter Q8H     timolol maleate  1 drop Right Eye Daily                ROS:   RESP, CV, GI,, MUSCULOSKELETAL, HEME/ALLERGY/IMMUNE,  Skin: reviewed and negative except the positives mentioned in this note            Physical Exam:       Vital Sign Ranges  Temp:  [97.5  F (36.4  C)-97.9  F (36.6  C)] 97.5  F (36.4  C)  Pulse:  [] 99  Resp:  [16] 16  BP: (127-151)/(71-89) 133/73  SpO2:  [95 %-96 %] 96 %      I/O last 3 completed shifts:  In: 75 [P.O.:75]  Out: -     Constitutional: Awake, alert, cooperative, no apparent distress, muscle loss from his temples.  HEENT: unremarkable  Neck: Supple, no adenopathy, thyroid symmetric, not enlarged and no tenderness  Lungs: No increased work of breathing, good air exchange, clear to auscultation bilaterally, no crackles or wheezing.  Cardiovascular: Regular rate and rhythm, normal S1 and S2,no murmur noted.  Abdomen: No scars, normal bowel sounds, soft, non-distended, non-tender, no masses palpated, no hepatosplenomegally.  Ext: no edema, cyanosis           Data:       ROUTINE LABS (Last four results)  CMPRecent Labs   Lab 07/25/22  0734 07/24/22  1551    136   POTASSIUM 4.1 4.0   CHLORIDE 107 105   CO2 22 23   ANIONGAP 10 8   * 180*   BUN 29 25   CR 0.62* 0.58*   GFRESTIMATED >90 >90   JENNYFER 9.6 9.8   PROTTOTAL 6.3* 7.0   ALBUMIN 1.8* 1.9*   BILITOTAL 2.9* 2.2*   ALKPHOS 636* 759*   * 231*   ALT 28 33     CBC  Recent Labs   Lab 07/25/22  0734 07/24/22  1551   WBC 13.3* 11.2*   RBC 4.15* 4.42   HGB 13.6 14.7   HCT 41.5 45.7   MCV  100 103*   MCH 32.8 33.3*   MCHC 32.8 32.2   RDW 17.3* 17.2*    279     INR  Recent Labs   Lab 07/26/22  0727   INR 1.45*            Assessment and Plan:   1. 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  - Was evaluated at multidisciplinary liver tumor board, plan was treat with Bevacizumab and Atezolizumab, restage and consider locoregional therapy   - He received 3 cycles Atezo and Marlen, completing 6/24/21  - A CT scan done 7/13/22 at [facility] revealed multiple new metastatic lesions scattered throughout the lungs and in the right hilum. The abdomen shows increase in size of dominant primarily hypodense mass filling the right lobe of the liver resulting in new mild hepatomegaly. Increased splenomegaly, now moderate. New mild ascites  - 7/25/22 calculated Child Schneider score for cirrhosis reveals class B using an old INR value  - No biliary dilatation on scans, but slightly increasing bilirubin      2. Failure to thrive  - Albumin 1.8   - Likely due to cancer progression vs infection vs other, less likely treatment related   - Patient reports that since hospital discharge (7/18/22, detailed below), he has significantly declined, to the point where he was unable to get out of bed and presented to hospital  -Currently ECOG performance status 3.    PLAN:  I had a lengthy discussion with Mr. Tejeda, I explained that his overall health has declined significantly and has poor performance status and my recommendation was focusing on comfort and against second line systemic therapy for progressive hepatocellular carcinoma on first-line immunotherapy.  Second line therapy is associated with a modest improvement in survival and very low response rate, based on his performance status I expect that the low potential benefit is outweighed by risks of treatment.  He expressed that  "is desires to \"diet at home\" I explained that hospice enrollment make that more likely and he was in agreement.  I will involve hospice team while in the hospital for eventual discharge to home with hospice care.    Camden Cortez M.D.      "

## 2022-07-26 NOTE — PROVIDER NOTIFICATION
MD Notification    Notified Person: MD    Notified Person Name:  Dr. Masterson    Notification Date/Time:  7/26   1030    Notification Interaction: Web Page    Purpose of Notification: Pt and family again requesting a hospice consult and state they do not want to wait for oncology, as per them, oncology told them to consider hospice.    Orders Received:    Comments:

## 2022-07-27 PROCEDURE — 99239 HOSP IP/OBS DSCHRG MGMT >30: CPT | Performed by: HOSPITALIST

## 2022-07-27 PROCEDURE — 250N000013 HC RX MED GY IP 250 OP 250 PS 637: Performed by: HOSPITALIST

## 2022-07-27 PROCEDURE — 258N000003 HC RX IP 258 OP 636: Performed by: HOSPITALIST

## 2022-07-27 RX ORDER — MORPHINE SULFATE 10 MG/5ML
5-10 SOLUTION ORAL
Qty: 15 ML | Refills: 0 | Status: SHIPPED | OUTPATIENT
Start: 2022-07-27

## 2022-07-27 RX ORDER — LORAZEPAM 1 MG/1
1 TABLET ORAL
Qty: 15 TABLET | Refills: 0 | Status: SHIPPED | OUTPATIENT
Start: 2022-07-27

## 2022-07-27 RX ORDER — SALIVA STIMULANT COMB. NO.3
1 SPRAY, NON-AEROSOL (ML) MUCOUS MEMBRANE
Qty: 44.3 ML | Refills: 0 | Status: SHIPPED | OUTPATIENT
Start: 2022-07-27

## 2022-07-27 RX ADMIN — MORPHINE SULFATE 10 MG: 10 SOLUTION ORAL at 08:54

## 2022-07-27 RX ADMIN — SODIUM CHLORIDE: 9 INJECTION, SOLUTION INTRAVENOUS at 01:27

## 2022-07-27 RX ADMIN — MORPHINE SULFATE 10 MG: 10 SOLUTION ORAL at 03:35

## 2022-07-27 ASSESSMENT — ACTIVITIES OF DAILY LIVING (ADL)
ADLS_ACUITY_SCORE: 60

## 2022-07-27 NOTE — PROGRESS NOTES
Care Management Discharge Note    Discharge Date: 07/27/2022     Discharge Disposition: Home with [facility]    [address] [facility] [address]    Discharge Services: [facility]    [address] [facility] [address] [phone]    Discharge DME: Other (see comment) (Over the bed table)    Discharge Transportation:  Stretcher Transport at 10:00 am. Patient in need of stretcher transport secondary to hospice enrollment and pain.  PCS form completed, faxed to  and provided to [facility] [place] [place].     Private pay costs discussed: transportation costs - discussed that we can not guarantee insurance coverage for transport.     PAS Confirmation Code:  N/A  Patient/family educated on Medicare website which has current facility and service quality ratings: yes    Education Provided on the Discharge Plan:    Persons Notified of Discharge Plans: Patient, spouse, daughters Jennifer and Genny  Patient/Family in Agreement with the Plan: yes    Handoff Referral Completed: No    Additional Information:  Received discharge orders for patient to discharge to home with hospice today at 10:00. [facility] to meet with patient at home between 12:00-12:30. Faxed discharge orders to hospice and updated hospice liaison on discharge plan.     SINDHU Cifuentes, LGSW  [phone]  [facility]

## 2022-07-27 NOTE — PLAN OF CARE
Comfort care, assessments and VS deferred. Pain controlled with PRN roxanol. Regular diet. Bedrest, not OOB this shift. T/R q 2 hrs - refuses at times. Incontinent of urine, no BM this shift. R PIV infusing NS @75 ml/hr. SW following. Discharge to outpatient hospice pending.

## 2022-07-27 NOTE — DISCHARGE SUMMARY
[facility]    Discharge Summary  Hospitalist    Date of Admission:  7/24/2022  Date of Discharge:  7/27/2022    Discharge Diagnoses      Failure to thrive in adult  Metastatic malignant neoplasm, unspecified site (H)    History of Present Illness   Tani Tejeda is an 82 year old male who presented with progressively worsening weakness and reduced oral intake with dehydration    Hospital Course   Tani Tejeda was admitted on 7/24/2022.  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  CT chest for PE negative for pulmonary embolism.  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  Patient is being mainly admitted for failure to thrive and progressive weakness.     #1 progressive fatigue with failure to thrive with severe malnutrition with dehydration  Hypoproteinemia  -Likely from his underlying malignancy.  Unclear prognosis at this time.  -[place] oncology consulted.  -Patient has previous known history of portal vein tumor thrombosis.  No need for anticoagulation at this time.  Has chronic cirrhosis.  -Likely has underlying severe protein calorie malnutrition.  -No obvious source of infection.  Was recently treated for sepsis of unclear source.  Currently has 3 more days left of his Flagyl and cefuroxime.  We will continue this and finish the course.  -Has loose stools but only once a day.  Unlikely for this to be C. Difficile.  -Started on gentle IV fluids normal saline 75 cc an hour due to his clinical signs of dehydration  -Patient currently would like to proceed with hospice referral and would like to be discharged home with hospice.   Palliative care consult-social work consult for hospice referral have both been placed.  -Comfort care orders have been started including sublingual morphine for pain control, Ativan for anxiety and agitation.  -Oncology is in agreement with this plan.  -Patient discharged in stable condition back home with hospice.  3-day supply of meds provided.     2.  Metastatic hepatocellular cancer-currently on immunotherapy.  Plan for possible radiation treatment of the local mass.  CT abdomen pelvis done during this admission shows extensive metastatic disease.  Likely a progression from his previous presentation.  Oncology team evaluated the patient and had extensive discussions with them.  Decided to proceed with comfort cares at this time.     3.  Leukocytosis-unclear source.  He is already on antibiotics.  Was recently treated for sepsis of unknown origin.  And CT chest abdomen pelvis yesterday did not show any obvious infection but it did show metastatic disease and this could be stress response.  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        DVT Prophylaxis: Pneumatic Compression Devices  Code Status: No CPR- Do NOT Intubate  Disposition: Expected discharge        Isabela Masterson MD, MD  [phone](p)  [phone]( c                    Isabela Masterson MD, MD    Code Status   Comfort Care       Primary Care Physician   Physician No Ref-Primary    Physical Exam                      Vitals:    07/24/22 1538 07/24/22 2022 07/26/22 0604   Weight: 77.1 kg (169 lb 15.6 oz) 77.1 kg (169 lb 15.6 oz) 82.3 kg (181 lb 7 oz)     Vital Signs with Ranges     No intake/output data recorded.    Physical Exam  Constitutional:       Appearance: He is ill-appearing.      Comments: Thin and weak   HENT:      Head: Normocephalic.   Eyes:      Pupils: Pupils are equal, round, and reactive to light.   Cardiovascular:      Rate and Rhythm: Normal rate and regular rhythm.      Pulses: Normal pulses.       Heart sounds: Normal heart sounds.   Pulmonary:      Effort: Pulmonary effort is normal. No respiratory distress.      Breath sounds: Normal breath sounds.   Abdominal:      General: There is distension.      Tenderness: There is no abdominal tenderness. There is no guarding.   Musculoskeletal:         General: Normal range of motion.      Cervical back: Normal range of motion.   Skin:     General: Skin is warm and dry.      Coloration: Skin is pale.   Neurological:      General: No focal deficit present.   Psychiatric:      Comments: Flat affect           Discharge Disposition   Discharged to home  Condition at discharge: Terminal    Consultations This Hospital Stay   PHARMACY IP CONSULT  PHARMACY IP CONSULT  NUTRITION SERVICES ADULT IP CONSULT  PHYSICAL THERAPY ADULT IP CONSULT  OCCUPATIONAL THERAPY ADULT IP CONSULT  HEMATOLOGY & ONCOLOGY IP CONSULT  HEMATOLOGY & ONCOLOGY IP CONSULT  SOCIAL WORK IP CONSULT  PALLIATIVE CARE ADULT IP CONSULT  CARE MANAGEMENT / SOCIAL WORK IP CONSULT  HEMATOLOGY & ONCOLOGY IP CONSULT    Time Spent on this Encounter   IIsabela MD, personally saw the patient today and spent greater than 30 minutes discharging this patient.    Discharge Orders      Reason for your hospital stay    Metastatic liver cancer     Follow-up and recommended labs and tests     Follow up with primary care provider, Physician No Ref-Primary, within 7 days for hospital follow- up.  The following labs/tests are recommended: cbc, bmp in 1 week.     Diet    Follow this diet upon discharge: Orders Placed This Encounter      Snacks/Supplements Adult: Nepro Oral Supplement; Between Meals      Combination Diet Regular Diet Adult       Discharge Medications   Current Discharge Medication List      START taking these medications    Details   artificial saliva (BIOTENE MT) SOLN solution Take 1 mL (1 spray) by mouth every hour as needed for dry mouth  Qty: 44.3 mL, Refills: 0    Associated Diagnoses: Metastatic  malignant neoplasm, unspecified site (H)      LORazepam (ATIVAN) 1 MG tablet Place 1 tablet (1 mg) under the tongue every 3 hours as needed for anxiety  Qty: 15 tablet, Refills: 0    Associated Diagnoses: Metastatic malignant neoplasm, unspecified site (H)      morphine 10 MG/5ML solution Take 2.5-5 mLs (5-10 mg) by mouth every hour as needed for moderate to severe pain (or dyspnea)  Qty: 15 mL, Refills: 0    Associated Diagnoses: Metastatic malignant neoplasm, unspecified site (H)         CONTINUE these medications which have NOT CHANGED    Details   acetaminophen (TYLENOL) 500 MG tablet Take by mouth daily as needed for mild pain      ALLEGRA 180 MG OR TABS Take 180 mg by mouth daily as needed for allergies      latanoprost (XALATAN) 0.005 % ophthalmic solution Place 1 drop into the right eye daily      timolol maleate (TIMOPTIC) 0.5 % ophthalmic solution Place 1 drop into the right eye daily      VITAMIN E PO Take 1 capsule by mouth daily         STOP taking these medications       aspirin 81 MG EC tablet Comments:   Reason for Stopping:         cefuroxime (CEFTIN) 500 MG tablet Comments:   Reason for Stopping:         metroNIDAZOLE (FLAGYL) 500 MG tablet Comments:   Reason for Stopping:             Allergies   No Known Allergies  Data   Recent Labs   Lab Test 07/26/22  0727 07/25/22  0734 07/24/22  1551   WBC  --  13.3* 11.2*   HGB  --  13.6 14.7   MCV  --  100 103*   PLT  --  280 279   INR 1.45*  --   --       Recent Labs   Lab Test 07/25/22  0734 07/24/22  1551    136   POTASSIUM 4.1 4.0   CHLORIDE 107 105   CO2 22 23   BUN 29 25   CR 0.62* 0.58*   ANIONGAP 10 8   JENNYFER 9.6 9.8   * 180*         Results for orders placed or performed during the hospital encounter of 07/24/22   CT Chest (PE) Abdomen Pelvis w Contrast     Value    Radiologist flags Metastatic disease, tumor thrombus (Urgent)    Narrative    EXAM: CT CHEST PE ABDOMEN PELVIS W CONTRAST  LOCATION: [facility]  HOSPITAL  DATE/TIME: 7/24/2022 4:53 PM    INDICATION: Increased shortness of breath, elevated dimer, rule out PE, increased abdominal distention, history of liver cancer  COMPARISON: None.  TECHNIQUE: CT chest pulmonary angiogram and routine CT abdomen pelvis with IV contrast. Arterial phase through the chest and venous phase through the abdomen and pelvis. Multiplanar reformats and MIP reconstructions were performed. Dose reduction   techniques were used.   CONTRAST: 106mL Isovue 370        FINDINGS:  ANGIOGRAM CHEST: Pulmonary arteries are normal caliber and negative for pulmonary emboli. Thoracic aorta is negative for dissection. No CT evidence of right heart strain.     LUNGS AND PLEURA: Numerous (at least 20) bilateral pulmonary nodules. For example:  -Anterior right upper lobe, 1.9 x 1.7 cm (11/101)  -Left upper lobe laterally, 1.1 x 1.1 cm (11/93).    Small bilateral pleural effusions, right greater than left.    MEDIASTINUM/AXILLAE: Enlarged prevascular lymph node measuring 1.8 x 1.3 cm (9/100) and right hilar lymph node measuring 3.3 x 3.0 cm (9/114).    CORONARY ARTERY CALCIFICATION: Previous intervention (stents or CABG).    HEPATOBILIARY: Cirrhotic morphology with innumerable masses involving almost the entire right lobe. The largest mass in the inferior right lobe measures 13.5 x 11.3 cm (6/78). Expansile soft tissue involving the entire right portal vein, which extends   centrally into the main portal vein and proximal portion of the left portal vein (e.g. 6/54). Hepatic veins are not well visualized.    Small volume ascites.    PANCREAS: Calcifications in the head suggesting chronic pancreatitis.    SPLEEN: Enlarged, 16.9 cm craniocaudal.    ADRENAL GLANDS: 1.5 x 1.1 cm left adrenal nodule (6/78). Normal right adrenal.    KIDNEYS/BLADDER: Low-attenuation lesion in the lower pole right kidney measures just above fluid attenuation, probably a minimally complex cyst.    BOWEL: Normal.    LYMPH NODES: No  lymphadenopathy.    VASCULATURE: No abdominal aortic aneurysm. Mild atherosclerotic calcification.    PELVIC ORGANS: Normal.    MUSCULOSKELETAL: Partially healed right eighth rib fracture posteriorly. No aggressive or destructive lesions.      Impression    IMPRESSION:  1.  No acute pulmonary embolism or secondary signs of right heart strain.    2.  Morphologic changes of cirrhosis with the sequelae of portal hypertension, including splenomegaly and small volume ascites.    3.  Extensive tumor involvement of the entire right hepatic lobe with reported history of hepatocellular carcinoma. There is extends tumor involvement of the portal veins with tumor in vein involving all of the right portal veins and extending centrally   into the main portal vein and proximal left portal vein.    4.  Widespread metastatic disease involving the lungs, thoracic lymph nodes, and left adrenal gland.    5.  Small bilateral pleural effusions.    [Urgent Result: Metastatic disease, tumor thrombus]    Finding was identified on 7/24/2022 5:18 PM.     Dr. Tone Rogers was contacted by me on 7/24/2022 5:39 PM and verbalized understanding of the critical result.

## 2022-07-27 NOTE — PLAN OF CARE
Physical Therapy Discharge Summary    Reason for therapy discharge:    Discharged to home. With hospice    Progress towards therapy goal(s). See goals on Care Plan in [facility] electronic health record for goal details.  Goals not met.  Barriers to achieving goals:   discharge from facility.    Therapy recommendation(s):    No further therapy is recommended.

## 2022-07-27 NOTE — PLAN OF CARE
Goal Outcome Evaluation:      Comfort cares. DIC home via stretcher by EMS. IV removed. Pt discharged w/ packet and medications. Along w/ values of glasses and cell phone.

## 2022-07-27 NOTE — PLAN OF CARE
Occupational Therapy Discharge Summary    Reason for therapy discharge:    Change in medical status.  Changed to hospice cares, being transported home today for home hospice    Progress towards therapy goal(s). See goals on Care Plan in [facility] electronic health record for goal details.  Goals not met.  Barriers to achieving goals:   limited tolerance for therapy.    Therapy recommendation(s):    No further therapy is recommended.